# Patient Record
Sex: FEMALE | Race: WHITE
[De-identification: names, ages, dates, MRNs, and addresses within clinical notes are randomized per-mention and may not be internally consistent; named-entity substitution may affect disease eponyms.]

---

## 2019-06-21 ENCOUNTER — HOSPITAL ENCOUNTER (EMERGENCY)
Dept: HOSPITAL 47 - EC | Age: 69
Discharge: HOME | End: 2019-06-21
Payer: MEDICARE

## 2019-06-21 VITALS
DIASTOLIC BLOOD PRESSURE: 92 MMHG | HEART RATE: 72 BPM | RESPIRATION RATE: 19 BRPM | TEMPERATURE: 98.6 F | SYSTOLIC BLOOD PRESSURE: 125 MMHG

## 2019-06-21 DIAGNOSIS — I10: ICD-10-CM

## 2019-06-21 DIAGNOSIS — Z79.899: ICD-10-CM

## 2019-06-21 DIAGNOSIS — J32.9: ICD-10-CM

## 2019-06-21 DIAGNOSIS — Z88.5: ICD-10-CM

## 2019-06-21 DIAGNOSIS — T78.40XA: Primary | ICD-10-CM

## 2019-06-21 DIAGNOSIS — J20.9: ICD-10-CM

## 2019-06-21 DIAGNOSIS — K21.9: ICD-10-CM

## 2019-06-21 DIAGNOSIS — N39.0: ICD-10-CM

## 2019-06-21 DIAGNOSIS — Z91.013: ICD-10-CM

## 2019-06-21 DIAGNOSIS — Z88.8: ICD-10-CM

## 2019-06-21 LAB
HYALINE CASTS UR QL AUTO: 78 /LPF (ref 0–2)
PH UR: 5.5 [PH] (ref 5–8)
RBC UR QL: 36 /HPF (ref 0–5)
SP GR UR: 1.01 (ref 1–1.03)
SQUAMOUS UR QL AUTO: 3 /HPF (ref 0–4)
UROBILINOGEN UR QL STRIP: <2 MG/DL (ref ?–2)
WBC #/AREA URNS HPF: 76 /HPF (ref 0–5)

## 2019-06-21 PROCEDURE — 71046 X-RAY EXAM CHEST 2 VIEWS: CPT

## 2019-06-21 PROCEDURE — 87086 URINE CULTURE/COLONY COUNT: CPT

## 2019-06-21 PROCEDURE — 99283 EMERGENCY DEPT VISIT LOW MDM: CPT

## 2019-06-21 PROCEDURE — 81001 URINALYSIS AUTO W/SCOPE: CPT

## 2019-06-21 NOTE — XR
EXAMINATION TYPE: XR chest 2V

 

DATE OF EXAM: 6/21/2019

 

COMPARISON: 2/10/2013

 

HISTORY: Chest pain

 

TECHNIQUE:  Frontal and lateral views of the chest are obtained.

 

FINDINGS:  Heart and mediastinum are normal. Lungs are clear. Diaphragm is normal. Bony thorax appear
s normal.

 

IMPRESSION:  Normal chest. No change.

## 2019-06-21 NOTE — ED
General Adult HPI





- General


Chief complaint: Nausea/Vomiting/Diarrhea


Stated complaint: ENT


Time Seen by Provider: 06/21/19 18:02


Source: patient


Mode of arrival: ambulatory


Limitations: no limitations





- History of Present Illness


Initial comments: 


69-year-old female patient presents to the emergency department today for 

evaluation after having what she believes to be an ALLERGIC reaction from 

antibiotics she started yesterday.  Patient states that she was seen and 

evaluated at urgent care yesterday for a 3 week history of nasal congestion, 

facial pressure, and cough.  Patient states that she sought care because her 

cough is causing her to have urinary incontinence.  States that she was 

evaluated and diagnosed with a sinus infection, right otitis media, and urinary 

tract infection.  States that she took 1 dose of the antibiotic yesterday and 

then one dose today.  States that she did have swelling to her lips and to her 

hands.  States that she called urgent care was instructed to take Benadryl 

proceed to the nearest emergency Department.  Patient states the swelling has 

improved at this time.  She denies any tongue swelling or throat swelling with 

this.  Denies any shortness of breath.  Patient states that she feels generally 

unwell and has continued to have urinary incontinence.  States that he did not 

perform urinalysis yesterday were treating based on symptoms.  Patient denies 

any recent rash, fever, chills, chest pain, nausea, vomiting, diarrhea, 

constipation, back pain, numbness, tingling, dizziness, weakness, headache, 

visual changes, or any other complaints.








- Related Data


                                Home Medications











 Medication  Instructions  Recorded  Confirmed


 


Cefdinir 300 mg PO Q12HR 06/21/19 06/21/19


 


Estradiol [Estrace] 0.5 mg PO BID 06/21/19 06/21/19


 


Losartan/Hydrochlorothiazide 1 tab PO DAILY 06/21/19 06/21/19





[Hyzaar 100-25 Tablet]   


 


Metoprolol Tartrate [Lopressor] 50 mg PO BID 06/21/19 06/21/19


 


Omeprazole [PriLOSEC] 20 mg PO DAILY 06/21/19 06/21/19


 


cloNIDine HCL [Catapres] 0.2 mg PO BID 06/21/19 06/21/19








                                  Previous Rx's











 Medication  Instructions  Recorded


 


Famotidine [Pepcid] 20 mg PO HS #30 tablet 06/21/19


 


Phenazopyridine HCl [Pyridium] 100 mg PO TID #9 tab 06/21/19


 


Sulfamethoxazole/Trimethoprim 1 each PO BID #20 tablet 06/21/19





[Bactrim -160 mg]  


 


predniSONE 50 mg PO DAILY #5 tablet 06/21/19











                                    Allergies











Allergy/AdvReac Type Severity Reaction Status Date / Time


 


codeine Allergy  Anaphylaxis Verified 06/21/19 18:00


 


Iodine and Iodide Containing Allergy  Anaphylaxis Verified 06/21/19 18:00





Produc     


 


shellfish derived [Shellfish] Allergy  Anaphylaxis Verified 06/21/19 18:43


 


shrimp Allergy  Anaphylaxis Verified 06/21/19 18:43














Review of Systems


ROS Statement: 


Those systems with pertinent positive or pertinent negative responses have been 

documented in the HPI.





ROS Other: All systems not noted in ROS Statement are negative.





Past Medical History


Past Medical History: GERD/Reflux, Hypertension


History of Any Multi-Drug Resistant Organisms: None Reported


Past Surgical History: Hysterectomy


Past Psychological History: No Psychological Hx Reported


Smoking Status: Never smoker


Past Alcohol Use History: None Reported


Past Drug Use History: None Reported





General Exam


Limitations: no limitations


General appearance: alert, in no apparent distress, other (Physical well-dev

eloped, well-nourished adult female patient in no acute distress.  Vital signs 

upon presentation are temperature 98.1F, pulse 102, respirations 22, blood 

pressure 168/80, pulse ox 100% on room air.)


Eye exam: Present: normal appearance, PERRL, EOMI.  Absent: scleral icterus, 

conjunctival injection, periorbital swelling


ENT exam: Present: normal exam, normal oropharynx, mucous membranes moist, TM's 

normal bilaterally


Respiratory exam: Present: normal lung sounds bilaterally.  Absent: respiratory 

distress, wheezes, rales, rhonchi, stridor


Cardiovascular Exam: Present: regular rate, normal rhythm, normal heart sounds. 

 Absent: systolic murmur, diastolic murmur, rubs, gallop, clicks


GI/Abdominal exam: Present: soft, normal bowel sounds.  Absent: distended, 

tenderness, guarding, rebound, rigid


Neurological exam: Present: alert, oriented X3, CN II-XII intact


Psychiatric exam: Present: normal affect, normal mood


Skin exam: Present: warm, dry, intact, normal color.  Absent: rash





Course


                                   Vital Signs











  06/21/19 06/21/19





  17:56 19:28


 


Temperature 98.1 F 


 


Pulse Rate 102 H 88


 


Respiratory 22 17





Rate  


 


Blood Pressure 168/80 141/92


 


O2 Sat by Pulse 100 100





Oximetry  














Medical Decision Making





- Medical Decision Making


69-year-old female patient presented to the emergency department today for 

evaluation of possible ALLERGIC reaction to Cefdinir.  Patient states that her 

second dose this morning and afterwards had lip and hand swelling.  Patient 

presented to urgent care yesterday for symptoms of urinary incontinence, cough, 

nasal congestion 3 weeks.  Physical examination did reveal soft nontender 

abdomen.  Chest x-ray showed no acute cardio pulmonary process.  Urinalysis was 

obtained and showed a cloudy appearance with trace protein, moderate blood, 

large leukocyte esterase, 36 red blood cells, 76 white blood cells, rare 

amorphous sediment, few urine bacteria, 70 hyaline casts, and rare urine mucus. 

 Patient symptoms are consistent with acute bronchitis, she also has urinary 

tract infection.  We'll treat with prednisone for 5 days.  She'll be given 

Pepcid to prevent ALLERGIC symptoms returning.  She was started on Bactrim for 

UTI.  She is instructed to follow-up with her primary care physician for recheck

 in 1-2 days.  Return parameters were discussed in detail.  She verbalizes 

understanding and agrees with this plan.








- Lab Data


                                   Lab Results











  06/21/19 Range/Units





  18:40 


 


Urine Color  Yellow  


 


Urine Appearance  Cloudy H  (Clear)  


 


Urine pH  5.5  (5.0-8.0)  


 


Ur Specific Gravity  1.008  (1.001-1.035)  


 


Urine Protein  Trace H  (Negative)  


 


Urine Glucose (UA)  Negative  (Negative)  


 


Urine Ketones  Negative  (Negative)  


 


Urine Blood  Moderate H  (Negative)  


 


Urine Nitrite  Negative  (Negative)  


 


Urine Bilirubin  Negative  (Negative)  


 


Urine Urobilinogen  <2.0  (<2.0)  mg/dL


 


Ur Leukocyte Esterase  Large H  (Negative)  


 


Urine RBC  36 H  (0-5)  /hpf


 


Urine WBC  76 H  (0-5)  /hpf


 


Ur Squamous Epith Cells  3  (0-4)  /hpf


 


Amorphous Sediment  Rare H  (None)  /hpf


 


Urine Bacteria  Few H  (None)  /hpf


 


Hyaline Casts  78 H  (0-2)  /lpf


 


Urine Mucus  Rare H  (None)  /hpf














- Radiology Data


Radiology results: report reviewed, image reviewed


Two-view x-ray of the chest is obtained.  Report was reviewed in its entirety.  

Impression by Dr. Naranjo shows normal chest with no change.





Disposition


Clinical Impression: 


 Urinary tract infection, Allergic reaction, Acute bronchitis, Sinusitis





Disposition: HOME SELF-CARE


Condition: Good


Instructions (If sedation given, give patient instructions):  Urinary Tract 

Infection in Women (ED), Sinusitis (ED), Acute Bronchitis (ED), Antibiotic 

Medication Allergy (ED)


Additional Instructions: 


Increase fluids.  Take medications as directed.  Follow-up with your primary 

care physician for recheck in 1-2 days.  Return to the emergency department 

immediately for any new, worsening, or concerning symptoms.


Prescriptions: 


Sulfamethoxazole/Trimethoprim [Bactrim -160 mg] 1 each PO BID #20 tablet


Famotidine [Pepcid] 20 mg PO HS #30 tablet


predniSONE 50 mg PO DAILY #5 tablet


Phenazopyridine HCl [Pyridium] 100 mg PO TID #9 tab


Is patient prescribed a controlled substance at d/c from ED?: No


Referrals: 


Donnell Boykin DO [Primary Care Provider] - 1-2 days


Time of Disposition: 19:57

## 2021-05-26 ENCOUNTER — HOSPITAL ENCOUNTER (OUTPATIENT)
Dept: HOSPITAL 47 - RADNMMAIN | Age: 71
Discharge: HOME | End: 2021-05-26
Attending: FAMILY MEDICINE
Payer: MEDICARE

## 2021-05-26 DIAGNOSIS — K81.0: Primary | ICD-10-CM

## 2021-05-26 PROCEDURE — 78226 HEPATOBILIARY SYSTEM IMAGING: CPT

## 2021-05-26 NOTE — NM
EXAMINATION TYPE: NM hepatobiliary wo EF

 

DATE OF EXAM: 5/26/2021

 

COMPARISON: NONE

 

HISTORY: Acute cholecystitis border. Epigastric pain with reflux-like symptoms.

 

TECHNIQUE: After the intravenous administration of 4.6 mCi Tc 99m Mebrofenin hepatobiliary scintigrap
hy is performed.  Immediate images post injection.

 

FINDINGS: 

There is satisfactory initial accumulation of tracer by the liver.  The gallbladder is visualized wit
hin 30 minutes slightly more centrally in position overlying the common bile duct near the lanre hepa
tis.  Exam was performed up to 4 hours to show no visualization of other rounded structure. There is 
complete radiotracer clearance from the liver. The small bowel activity is noted within 20 minutes.  
Therefore there is no scintigraphic evidence of cystic or common bile duct obstruction to suggest acu
te cholecystitis. 

 

IMPRESSION: Exam is felt within normal limits.

## 2022-11-09 ENCOUNTER — HOSPITAL ENCOUNTER (INPATIENT)
Dept: HOSPITAL 47 - EC | Age: 72
LOS: 6 days | Discharge: HOME | DRG: 813 | End: 2022-11-15
Attending: FAMILY MEDICINE | Admitting: FAMILY MEDICINE
Payer: MEDICARE

## 2022-11-09 DIAGNOSIS — I10: ICD-10-CM

## 2022-11-09 DIAGNOSIS — E87.6: ICD-10-CM

## 2022-11-09 DIAGNOSIS — Z91.013: ICD-10-CM

## 2022-11-09 DIAGNOSIS — I08.1: ICD-10-CM

## 2022-11-09 DIAGNOSIS — Z90.710: ICD-10-CM

## 2022-11-09 DIAGNOSIS — E83.42: ICD-10-CM

## 2022-11-09 DIAGNOSIS — I48.20: ICD-10-CM

## 2022-11-09 DIAGNOSIS — Z88.5: ICD-10-CM

## 2022-11-09 DIAGNOSIS — E66.01: ICD-10-CM

## 2022-11-09 DIAGNOSIS — Z79.01: ICD-10-CM

## 2022-11-09 DIAGNOSIS — K31.7: ICD-10-CM

## 2022-11-09 DIAGNOSIS — D62: ICD-10-CM

## 2022-11-09 DIAGNOSIS — J33.8: ICD-10-CM

## 2022-11-09 DIAGNOSIS — Z79.899: ICD-10-CM

## 2022-11-09 DIAGNOSIS — K21.9: ICD-10-CM

## 2022-11-09 DIAGNOSIS — K57.31: ICD-10-CM

## 2022-11-09 DIAGNOSIS — Z91.041: ICD-10-CM

## 2022-11-09 DIAGNOSIS — D68.32: Primary | ICD-10-CM

## 2022-11-09 DIAGNOSIS — K64.4: ICD-10-CM

## 2022-11-09 DIAGNOSIS — Z79.82: ICD-10-CM

## 2022-11-09 DIAGNOSIS — K29.50: ICD-10-CM

## 2022-11-09 LAB
ALBUMIN SERPL-MCNC: 3.9 G/DL (ref 3.5–5)
ALP SERPL-CCNC: 83 U/L (ref 38–126)
ALT SERPL-CCNC: 14 U/L (ref 4–34)
ANION GAP SERPL CALC-SCNC: 9 MMOL/L
APTT BLD: 21.2 SEC (ref 22–30)
AST SERPL-CCNC: 28 U/L (ref 14–36)
BASOPHILS # BLD AUTO: 0 K/UL (ref 0–0.2)
BASOPHILS NFR BLD AUTO: 1 %
BUN SERPL-SCNC: 23 MG/DL (ref 7–17)
CALCIUM SPEC-MCNC: 9.9 MG/DL (ref 8.4–10.2)
CHLORIDE SERPL-SCNC: 105 MMOL/L (ref 98–107)
CO2 SERPL-SCNC: 23 MMOL/L (ref 22–30)
EOSINOPHIL # BLD AUTO: 0.2 K/UL (ref 0–0.7)
EOSINOPHIL NFR BLD AUTO: 2 %
ERYTHROCYTE [DISTWIDTH] IN BLOOD BY AUTOMATED COUNT: 2.06 M/UL (ref 3.8–5.4)
ERYTHROCYTE [DISTWIDTH] IN BLOOD: 17.2 % (ref 11.5–15.5)
GLUCOSE BLD-MCNC: 168 MG/DL (ref 70–110)
GLUCOSE SERPL-MCNC: 131 MG/DL (ref 74–99)
HCT VFR BLD AUTO: 16.5 % (ref 34–46)
HGB BLD-MCNC: 4.6 GM/DL (ref 11.4–16)
INR PPP: 1.2 (ref ?–1.2)
LYMPHOCYTES # SPEC AUTO: 1.7 K/UL (ref 1–4.8)
LYMPHOCYTES NFR SPEC AUTO: 24 %
MAGNESIUM SPEC-SCNC: 1.4 MG/DL (ref 1.6–2.3)
MCH RBC QN AUTO: 22.4 PG (ref 25–35)
MCHC RBC AUTO-ENTMCNC: 28 G/DL (ref 31–37)
MCV RBC AUTO: 80.1 FL (ref 80–100)
MONOCYTES # BLD AUTO: 0.5 K/UL (ref 0–1)
MONOCYTES NFR BLD AUTO: 7 %
NEUTROPHILS # BLD AUTO: 4.4 K/UL (ref 1.3–7.7)
NEUTROPHILS NFR BLD AUTO: 63 %
PLATELET # BLD AUTO: 196 K/UL (ref 150–450)
POTASSIUM SERPL-SCNC: 3.6 MMOL/L (ref 3.5–5.1)
PROT SERPL-MCNC: 6.9 G/DL (ref 6.3–8.2)
PT BLD: 12.9 SEC (ref 9–12)
SODIUM SERPL-SCNC: 137 MMOL/L (ref 137–145)
WBC # BLD AUTO: 7 K/UL (ref 3.8–10.6)

## 2022-11-09 PROCEDURE — 36430 TRANSFUSION BLD/BLD COMPNT: CPT

## 2022-11-09 PROCEDURE — 86850 RBC ANTIBODY SCREEN: CPT

## 2022-11-09 PROCEDURE — 85730 THROMBOPLASTIN TIME PARTIAL: CPT

## 2022-11-09 PROCEDURE — 85610 PROTHROMBIN TIME: CPT

## 2022-11-09 PROCEDURE — 86900 BLOOD TYPING SEROLOGIC ABO: CPT

## 2022-11-09 PROCEDURE — 80048 BASIC METABOLIC PNL TOTAL CA: CPT

## 2022-11-09 PROCEDURE — 82728 ASSAY OF FERRITIN: CPT

## 2022-11-09 PROCEDURE — 82272 OCCULT BLD FECES 1-3 TESTS: CPT

## 2022-11-09 PROCEDURE — 82607 VITAMIN B-12: CPT

## 2022-11-09 PROCEDURE — 43239 EGD BIOPSY SINGLE/MULTIPLE: CPT

## 2022-11-09 PROCEDURE — 84484 ASSAY OF TROPONIN QUANT: CPT

## 2022-11-09 PROCEDURE — 80053 COMPREHEN METABOLIC PANEL: CPT

## 2022-11-09 PROCEDURE — 83550 IRON BINDING TEST: CPT

## 2022-11-09 PROCEDURE — 85027 COMPLETE CBC AUTOMATED: CPT

## 2022-11-09 PROCEDURE — 88342 IMHCHEM/IMCYTCHM 1ST ANTB: CPT

## 2022-11-09 PROCEDURE — 30233N1 TRANSFUSION OF NONAUTOLOGOUS RED BLOOD CELLS INTO PERIPHERAL VEIN, PERCUTANEOUS APPROACH: ICD-10-PCS

## 2022-11-09 PROCEDURE — 0DJD8ZZ INSPECTION OF LOWER INTESTINAL TRACT, VIA NATURAL OR ARTIFICIAL OPENING ENDOSCOPIC: ICD-10-PCS

## 2022-11-09 PROCEDURE — 82746 ASSAY OF FOLIC ACID SERUM: CPT

## 2022-11-09 PROCEDURE — 85025 COMPLETE CBC W/AUTO DIFF WBC: CPT

## 2022-11-09 PROCEDURE — 86920 COMPATIBILITY TEST SPIN: CPT

## 2022-11-09 PROCEDURE — 71046 X-RAY EXAM CHEST 2 VIEWS: CPT

## 2022-11-09 PROCEDURE — 36415 COLL VENOUS BLD VENIPUNCTURE: CPT

## 2022-11-09 PROCEDURE — 99291 CRITICAL CARE FIRST HOUR: CPT

## 2022-11-09 PROCEDURE — 88305 TISSUE EXAM BY PATHOLOGIST: CPT

## 2022-11-09 PROCEDURE — 45378 DIAGNOSTIC COLONOSCOPY: CPT

## 2022-11-09 PROCEDURE — 93005 ELECTROCARDIOGRAM TRACING: CPT

## 2022-11-09 PROCEDURE — 86901 BLOOD TYPING SEROLOGIC RH(D): CPT

## 2022-11-09 PROCEDURE — 83735 ASSAY OF MAGNESIUM: CPT

## 2022-11-09 PROCEDURE — 83540 ASSAY OF IRON: CPT

## 2022-11-09 PROCEDURE — 84132 ASSAY OF SERUM POTASSIUM: CPT

## 2022-11-09 NOTE — XR
EXAMINATION TYPE: XR chest 2V

 

DATE OF EXAM: 11/9/2022

 

COMPARISON: NONE

 

HISTORY: Short of breath

 

TECHNIQUE: 2 views

 

FINDINGS: Heart and mediastinum are normal. Lungs are clear. Diaphragm is normal. Bony thorax is inta
ct.

 

IMPRESSION: Normal chest. No change.

## 2022-11-09 NOTE — ED
General Adult HPI





- General


Chief complaint: Recheck/Abnormal Lab/Rx


Stated complaint: lab recheck


Time Seen by Provider: 11/09/22 19:00


Source: patient, RN notes reviewed, old records reviewed


Mode of arrival: ambulatory


Limitations: no limitations





- History of Present Illness


Initial comments: 





This is a 72-year-old female who presents emergency department stating that 

she's been tired lately and very short of breath.  Patient states blood work was

drawn today and she was told to come to the emergency department because of 

blood work showed a very low hemoglobin.  Patient states she's had a low 

hemoglobin in the past from a GI bleed.  Patient states she is on eliquis for 

her atrial fibrillation.  Patient also has noticed some increased swelling to 

her legs bilaterally.  Patient denies any recent fever chills or cough per 

patient denies any headache.  Patient states she is lightheaded when she exerts 

herself or stands up quickly.  Patient denies any abdominal pain patient denies 

any nausea vomiting or diarrhea.





- Related Data


                                Home Medications











 Medication  Instructions  Recorded  Confirmed


 


Omeprazole [PriLOSEC] 20 mg PO DAILY 06/21/19 11/09/22


 


cloNIDine HCL [Catapres] 0.2 mg PO BID 06/21/19 11/09/22


 


estradioL [Estrace] 0.5 mg PO BID 06/21/19 11/09/22


 


ALPRAZolam [Xanax] 0.25 mg PO DAILY PRN 11/09/22 11/09/22


 


Apixaban [Eliquis] 5 mg PO BID 11/09/22 11/09/22


 


Aspirin EC [Ecotrin Low Dose] 81 mg PO DAILY 11/09/22 11/09/22


 


Losartan/Hydrochlorothiazide 1 tab PO DAILY 11/09/22 11/09/22





[Losartan-Hctz 100-25 mg Tab]   


 


Metoprolol Tartrate [Lopressor] 50 mg PO TID 11/09/22 11/09/22











                                    Allergies











Allergy/AdvReac Type Severity Reaction Status Date / Time


 


codeine Allergy  Anaphylaxis Verified 11/09/22 19:53


 


Iodine and Iodide Containing Allergy  Anaphylaxis Verified 11/09/22 19:53





Produc     


 


shellfish derived [Shellfish] Allergy  Anaphylaxis Verified 11/09/22 19:53


 


shrimp Allergy  Anaphylaxis Verified 11/09/22 19:53














Review of Systems


ROS Statement: 


Those systems with pertinent positive or pertinent negative responses have been 

documented in the HPI.





ROS Other: All systems not noted in ROS Statement are negative.





Past Medical History


Past Medical History: GERD/Reflux, Hypertension


Additional Past Medical History / Comment(s): GI bleed.


History of Any Multi-Drug Resistant Organisms: None Reported


Past Surgical History: Hysterectomy


Past Psychological History: No Psychological Hx Reported


Smoking Status: Never smoker


Past Alcohol Use History: None Reported


Past Drug Use History: None Reported





General Exam





- General Exam Comments


Initial Comments: 





GENERAL:


Patient is well-developed and well-nourished.  Patient is nontoxic and well-

hydrated and is in no acute distress.





ENT:


Neck is soft and supple.  No significant lymphadenopathy is noted.  Oropharynx 

is clear.  Moist mucous membranes.  Neck has full range of motion without 

eliciting any pain.  





EYES:


The sclera were anicteric and conjunctiva pale.  Extraocular movements were 

intact and pupils were equal round and reactive to light.  Eyelids were 

unremarkable.





PULMONARY:


Unlabored respirations.  Good breath sounds bilaterally.  No audible rales 

rhonchi or wheezing was noted.





CARDIOVASCULAR:


There is a regular rate and rhythm without any murmurs gallops or rubs.  





ABDOMEN:


Soft and nontender with normal bowel sounds.  





SKIN:


Patient's skin is very pale.





NEUROLOGIC:


Patient is alert and oriented x3.  Cranial nerves II through XII are grossly 

intact.  Motor and sensory are also intact.  Normal speech, volume and content. 

 Symmetrical smile. 





MUSCULOSKELETAL:


Normal extremities with adequate strength and full range of motion.  1+ edema 

bilaterally





LYMPHATICS:


No significant lymphadenopathy is noted





PSYCHIATRIC:


Normal psychiatric evaluation. 


Limitations: no limitations





Course


                                   Vital Signs











  11/09/22 11/09/22





  18:42 19:58


 


Temperature 97.8 F 97.9 F


 


Pulse Rate 103 H 90


 


Respiratory 20 16





Rate  


 


Blood Pressure 119/60 134/81


 


O2 Sat by Pulse 100 100





Oximetry  














Medical Decision Making





- Medical Decision Making





Patient's hemoglobin came back 4.5.





I ordered 2 units packed red blood cell per the patient.





I spoke with Dr. Boykin she agreed to admit the patient admitted the patient 

wrote admitting orders





I interpreted EKG.  EKG shows atrial fibrillation at 92 bpm QRS is 99 QT 

interval is 364 QTC is 413.  Patient's EKG is of poor quality but no obvious ST 

segment elevations noted





- Lab Data


Result diagrams: 


                                 11/09/22 19:19





                                 11/09/22 19:19


                                   Lab Results











  11/09/22 11/09/22 11/09/22 Range/Units





  19:04 19:19 19:19 


 


WBC   7.0   (3.8-10.6)  k/uL


 


RBC   2.06 L   (3.80-5.40)  m/uL


 


Hgb   4.6 L*   (11.4-16.0)  gm/dL


 


Hct   16.5 L*   (34.0-46.0)  %


 


MCV   80.1   (80.0-100.0)  fL


 


MCH   22.4 L   (25.0-35.0)  pg


 


MCHC   28.0 L   (31.0-37.0)  g/dL


 


RDW   17.2 H   (11.5-15.5)  %


 


Plt Count   196   (150-450)  k/uL


 


MPV   10.2   


 


Hypochromasia   Marked   


 


Poikilocytosis   Slight   


 


Anisocytosis   Slight   


 


Microcytosis   Slight   


 


PT    12.9 H  (9.0-12.0)  sec


 


INR    1.2 H  (<1.2)  


 


APTT    21.2 L  (22.0-30.0)  sec


 


Sodium     (137-145)  mmol/L


 


Potassium     (3.5-5.1)  mmol/L


 


Chloride     ()  mmol/L


 


Carbon Dioxide     (22-30)  mmol/L


 


Anion Gap     mmol/L


 


BUN     (7-17)  mg/dL


 


Creatinine     (0.52-1.04)  mg/dL


 


Est GFR (CKD-EPI)AfAm     (>60 ml/min/1.73 sqM)  


 


Est GFR (CKD-EPI)NonAf     (>60 ml/min/1.73 sqM)  


 


Glucose     (74-99)  mg/dL


 


Calcium     (8.4-10.2)  mg/dL


 


Magnesium     (1.6-2.3)  mg/dL


 


Total Bilirubin     (0.2-1.3)  mg/dL


 


AST     (14-36)  U/L


 


ALT     (4-34)  U/L


 


Alkaline Phosphatase     ()  U/L


 


Troponin I     (0.000-0.034)  ng/mL


 


Total Protein     (6.3-8.2)  g/dL


 


Albumin     (3.5-5.0)  g/dL


 


Blood Type  B Negative    


 


Blood Type Confirm     


 


Blood Type Recheck  No Previous Record    


 


Bld Type Recheck Status  CABO Indicated    


 


Antibody Screen  NEGATIVE    


 


Crossmatch  See Detail    


 


Spec Expiration Date  11/12/2022/12/2022 - 2304 11/09/22 11/09/22 11/09/22 Range/Units





  19:19 19:19 19:19 


 


WBC     (3.8-10.6)  k/uL


 


RBC     (3.80-5.40)  m/uL


 


Hgb     (11.4-16.0)  gm/dL


 


Hct     (34.0-46.0)  %


 


MCV     (80.0-100.0)  fL


 


MCH     (25.0-35.0)  pg


 


MCHC     (31.0-37.0)  g/dL


 


RDW     (11.5-15.5)  %


 


Plt Count     (150-450)  k/uL


 


MPV     


 


Hypochromasia     


 


Poikilocytosis     


 


Anisocytosis     


 


Microcytosis     


 


PT     (9.0-12.0)  sec


 


INR     (<1.2)  


 


APTT     (22.0-30.0)  sec


 


Sodium  137    (137-145)  mmol/L


 


Potassium  3.6    (3.5-5.1)  mmol/L


 


Chloride  105    ()  mmol/L


 


Carbon Dioxide  23    (22-30)  mmol/L


 


Anion Gap  9    mmol/L


 


BUN  23 H    (7-17)  mg/dL


 


Creatinine  1.10 H    (0.52-1.04)  mg/dL


 


Est GFR (CKD-EPI)AfAm  58    (>60 ml/min/1.73 sqM)  


 


Est GFR (CKD-EPI)NonAf  51    (>60 ml/min/1.73 sqM)  


 


Glucose  131 H    (74-99)  mg/dL


 


Calcium  9.9    (8.4-10.2)  mg/dL


 


Magnesium  1.4 L    (1.6-2.3)  mg/dL


 


Total Bilirubin  0.9    (0.2-1.3)  mg/dL


 


AST  28    (14-36)  U/L


 


ALT  14    (4-34)  U/L


 


Alkaline Phosphatase  83    ()  U/L


 


Troponin I   <0.012   (0.000-0.034)  ng/mL


 


Total Protein  6.9    (6.3-8.2)  g/dL


 


Albumin  3.9    (3.5-5.0)  g/dL


 


Blood Type     


 


Blood Type Confirm    B Negative  


 


Blood Type Recheck     


 


Bld Type Recheck Status     


 


Antibody Screen     


 


Crossmatch     


 


Spec Expiration Date     














Critical Care Time


Critical Care Time: Yes


Total Critical Care Time: 35





Disposition


Clinical Impression: 


 GI bleed, Anemia





Disposition: ADMITTED AS IP TO THIS HOSP


Time of Disposition: 19:37

## 2022-11-10 LAB
BASOPHILS # BLD AUTO: 0.1 K/UL (ref 0–0.2)
BASOPHILS NFR BLD AUTO: 1 %
EOSINOPHIL # BLD AUTO: 0.2 K/UL (ref 0–0.7)
EOSINOPHIL NFR BLD AUTO: 2 %
ERYTHROCYTE [DISTWIDTH] IN BLOOD BY AUTOMATED COUNT: 2.61 M/UL (ref 3.8–5.4)
ERYTHROCYTE [DISTWIDTH] IN BLOOD BY AUTOMATED COUNT: 2.96 M/UL (ref 3.8–5.4)
ERYTHROCYTE [DISTWIDTH] IN BLOOD BY AUTOMATED COUNT: 3.02 M/UL (ref 3.8–5.4)
ERYTHROCYTE [DISTWIDTH] IN BLOOD: 16.5 % (ref 11.5–15.5)
ERYTHROCYTE [DISTWIDTH] IN BLOOD: 17.1 % (ref 11.5–15.5)
ERYTHROCYTE [DISTWIDTH] IN BLOOD: 17.2 % (ref 11.5–15.5)
HCT VFR BLD AUTO: 21.9 % (ref 34–46)
HCT VFR BLD AUTO: 25 % (ref 34–46)
HCT VFR BLD AUTO: 25.3 % (ref 34–46)
HGB BLD-MCNC: 6.7 GM/DL (ref 11.4–16)
HGB BLD-MCNC: 7.9 GM/DL (ref 11.4–16)
HGB BLD-MCNC: 8 GM/DL (ref 11.4–16)
LYMPHOCYTES # SPEC AUTO: 1.7 K/UL (ref 1–4.8)
LYMPHOCYTES NFR SPEC AUTO: 24 %
MCH RBC QN AUTO: 25.7 PG (ref 25–35)
MCH RBC QN AUTO: 26.5 PG (ref 25–35)
MCH RBC QN AUTO: 26.7 PG (ref 25–35)
MCHC RBC AUTO-ENTMCNC: 30.7 G/DL (ref 31–37)
MCHC RBC AUTO-ENTMCNC: 31.4 G/DL (ref 31–37)
MCHC RBC AUTO-ENTMCNC: 31.8 G/DL (ref 31–37)
MCV RBC AUTO: 83.6 FL (ref 80–100)
MCV RBC AUTO: 83.7 FL (ref 80–100)
MCV RBC AUTO: 84.4 FL (ref 80–100)
MONOCYTES # BLD AUTO: 0.5 K/UL (ref 0–1)
MONOCYTES NFR BLD AUTO: 7 %
NEUTROPHILS # BLD AUTO: 4.4 K/UL (ref 1.3–7.7)
NEUTROPHILS NFR BLD AUTO: 63 %
PLATELET # BLD AUTO: 182 K/UL (ref 150–450)
PLATELET # BLD AUTO: 191 K/UL (ref 150–450)
PLATELET # BLD AUTO: 215 K/UL (ref 150–450)
WBC # BLD AUTO: 6.3 K/UL (ref 3.8–10.6)
WBC # BLD AUTO: 7 K/UL (ref 3.8–10.6)
WBC # BLD AUTO: 7.5 K/UL (ref 3.8–10.6)

## 2022-11-10 PROCEDURE — 0DB78ZX EXCISION OF STOMACH, PYLORUS, VIA NATURAL OR ARTIFICIAL OPENING ENDOSCOPIC, DIAGNOSTIC: ICD-10-PCS

## 2022-11-10 RX ADMIN — METOPROLOL TARTRATE SCH MG: 50 TABLET, FILM COATED ORAL at 18:14

## 2022-11-10 RX ADMIN — MAGNESIUM SULFATE IN DEXTROSE SCH MLS/HR: 10 INJECTION, SOLUTION INTRAVENOUS at 15:36

## 2022-11-10 RX ADMIN — METOPROLOL TARTRATE SCH MG: 50 TABLET, FILM COATED ORAL at 07:15

## 2022-11-10 RX ADMIN — LOSARTAN POTASSIUM AND HYDROCHLOROTHIAZIDE SCH EACH: 12.5; 5 TABLET ORAL at 07:16

## 2022-11-10 RX ADMIN — METOPROLOL TARTRATE SCH MG: 50 TABLET, FILM COATED ORAL at 21:10

## 2022-11-10 RX ADMIN — MAGNESIUM SULFATE IN DEXTROSE SCH MLS/HR: 10 INJECTION, SOLUTION INTRAVENOUS at 18:14

## 2022-11-10 NOTE — P.OP
Date of Procedure: 11/10/22


Preoperative Diagnosis: 


Anemia


Postoperative Diagnosis: 


Antral polyp





No evidence of upper GI bleed


Procedure(s) Performed: 


EGD


Anesthesia: MAC


Surgeon: Hany Flores


Pathology: other (Antral polyp)


Condition: stable


Disposition: PACU


Description of Procedure: 


The patient's placed on the endoscopy table in the lateral position she received

IV sedation.  The gastroscope placed oropharynx passed in the esophagus and 

stomach.  Scope was then placed through the pylorus.  The first and second 

portion of duodenum appeared normal.  Scope was then brought back the antrum 

this there was a polyp seen in the antrum this was mildly inflamed.  A biopsies 

performed.  The scope was then retroflexed and the remainder of the stomach 

appeared normal.  The GE junction was at 40 cm per the distal esophagus appeared

normal.  The proximal esophagus appeared normal.  The scope was withdrawn.





There was no evidence of any upper GI bleed.

## 2022-11-10 NOTE — P.HPIM
History of Present Illness


H&P Date: 11/10/22


Chief Complaint: Fatigued, shortness of breath


This is a pleasant 72-year-old female past medical history of GI bleed-last 

episode reported approximately one year ago, atrial fibrillation-on Eliquis and 

aspirin, gastroesophageal reflux disease, hypertension and multiple other 

medical issues presented to the ER with fatigue,shortness of breath and 

lightheadedness upon exertion.  He reports no rectal bleeding, no hemoptysis but

does report dark stools approximately one week ago- spontaneously subsided.  

Denies abdominal pain.  Denies nausea, vomiting or diarrhea.  Denies cough, 

congestion.  Denies chills or fevers.  Denies chest pain, palpitations.  

Troponin negative. Denies headaches Denies syncope.  Reports itchy extremities-

puritis. Patient had outpatient lab work reported low hemoglobin 4.5 and 

instructed to proceed to the ER. On admission, hemoglobin 4.6, platelets 196, 

INR 1.2.  Afebrile,1.4.  Blood pressure stable, heart rates mid 80s to 103, 

maintaining O2 sats of 100% on room air, respiratory rate 16-20.  Transfused 

with 2 units of packed RBCs in the ER with repeat hemoglobin 6.7.








Review of Systems


ROS Statement: 


Those systems with pertinent positive or pertinent negative responses have been 

documented in the HPI.





ROS Other: All systems not noted in ROS Statement are negative.











Past Medical History


Past Medical History: Atrial Fibrillation, GERD/Reflux, Hypertension


Additional Past Medical History / Comment(s): GI bleed.


History of Any Multi-Drug Resistant Organisms: None Reported


Past Surgical History: Hysterectomy, Tonsillectomy


Past Anesthesia/Blood Transfusion Reactions: No Reported Reaction


Past Psychological History: No Psychological Hx Reported


Smoking Status: Never smoker


Past Alcohol Use History: None Reported


Past Drug Use History: None Reported





Medications and Allergies


                                Home Medications











 Medication  Instructions  Recorded  Confirmed  Type


 


Omeprazole [PriLOSEC] 20 mg PO DAILY 06/21/19 11/09/22 History


 


cloNIDine HCL [Catapres] 0.2 mg PO BID 06/21/19 11/09/22 History


 


estradioL [Estrace] 0.5 mg PO BID 06/21/19 11/09/22 History


 


ALPRAZolam [Xanax] 0.25 mg PO DAILY PRN 11/09/22 11/09/22 History


 


Apixaban [Eliquis] 5 mg PO BID 11/09/22 11/09/22 History


 


Aspirin EC [Ecotrin Low Dose] 81 mg PO DAILY 11/09/22 11/09/22 History


 


Losartan/Hydrochlorothiazide 1 tab PO DAILY 11/09/22 11/09/22 History





[Losartan-Hctz 100-25 mg Tab]    


 


Metoprolol Tartrate [Lopressor] 50 mg PO TID 11/09/22 11/09/22 History








                                    Allergies











Allergy/AdvReac Type Severity Reaction Status Date / Time


 


codeine Allergy  Anaphylaxis Verified 11/09/22 19:53


 


Iodine and Iodide Containing Allergy  Anaphylaxis Verified 11/09/22 19:53





Produc     


 


shellfish derived [Shellfish] Allergy  Anaphylaxis Verified 11/09/22 19:53


 


shrimp Allergy  Anaphylaxis Verified 11/09/22 19:53














Physical Exam


Vitals: 


                                   Vital Signs











  Temp Pulse Resp BP Pulse Ox


 


 11/10/22 03:00  98.4 F  90  14  134/77  95


 


 11/10/22 02:00  98.4 F  95  16  134/77  97


 


 11/10/22 00:54  98.4 F  98  16  134/77  96


 


 11/10/22 00:34  98.6 F  96  18  134/67  98


 


 11/10/22 00:00   102 H  16  132/71  96


 


 11/09/22 23:50  98.5 F  94  16  139/64  98


 


 11/09/22 23:47  98.8 F  100  16  132/71  99


 


 11/09/22 23:00  98.4 F  96  11 L  124/78  100


 


 11/09/22 21:44  98.3 F  72  16  127/80  100


 


 11/09/22 20:48  98.3 F  95  16  137/72 


 


 11/09/22 20:28  98.1 F  96  18  138/100 


 


 11/09/22 20:27  98 F  92  16  138/99  100


 


 11/09/22 20:10  97.9 F  84  16  131/98 


 


 11/09/22 19:58  97.9 F  90  16  134/81  100


 


 11/09/22 18:42  97.8 F  103 H  20  119/60  100








                                Intake and Output











 11/09/22 11/10/22 11/10/22





 22:59 06:59 14:59


 


Intake Total 0 1040 


 


Balance 0 1040 


 


Intake:   


 


  Intake, IV Titration  450 





  Amount   


 


    Sodium Chloride 0.9% 1,  450 





    000 ml @ 75 mls/hr IV .   





    [id number] ONE Rx#:[id number]   


 


  Blood Product 0 590 


 


    Rc As-1  Unit 0 310 





    [id number]   


 


    Rc Pheresis As-3  Unit  280 





    [id number]   


 


Other:   


 


  Voiding Method  Bedpan 


 


  # Voids 1 1 


 


  Weight 108.862 kg 116.5 kg 











PHYSICAL EXAM:


VITAL SIGNS: [As above]


GENERAL: Sitting up in bed, no acute distress


HEENT:  Conjunctivae normal. eyes normal. 


NECK:  No JVD. No thyroid enlargement. No LNs


CARDIOVASCULAR:  S1, S2 regular.No murmur


RESPIRATION: Unlabored Breath sounds diminished in the bases. No rhonchi or 

crackles. No bronchial breathing.


ABDOMEN:  Soft,  nontender . No guarding. no masses palpable. No ascites, No 

hepatosplenomegaly.Bowel sounds heard.


LEGS: Mild edema of lower extremities, with pruritus


PSYCHIATRY: Alert and oriented X3, mood and affect normal.


NERVOUS SYSTEM:  Cranial N 2-12 grossly normal. No focal deficits.  Strength and

sensation grossly intact.


Skin: Warm and dry, pruritus /scabs  of lower extremities














Results


CBC & Chem 7: 


                                 11/10/22 05:35





                                 11/09/22 19:19


Labs: 


                  Abnormal Lab Results - Last 24 Hours (Table)











  11/09/22 11/09/22 11/09/22 Range/Units





  19:04 19:19 19:19 


 


RBC   2.06 L   (3.80-5.40)  m/uL


 


Hgb   4.6 L*   (11.4-16.0)  gm/dL


 


Hct   16.5 L*   (34.0-46.0)  %


 


MCH   22.4 L   (25.0-35.0)  pg


 


MCHC   28.0 L   (31.0-37.0)  g/dL


 


RDW   17.2 H   (11.5-15.5)  %


 


PT    12.9 H  (9.0-12.0)  sec


 


INR    1.2 H  (<1.2)  


 


APTT    21.2 L  (22.0-30.0)  sec


 


BUN     (7-17)  mg/dL


 


Creatinine     (0.52-1.04)  mg/dL


 


Glucose     (74-99)  mg/dL


 


POC Glucose (mg/dL)     ()  mg/dL


 


Magnesium     (1.6-2.3)  mg/dL


 


Crossmatch  See Detail    














  11/09/22 11/09/22 11/10/22 Range/Units





  19:19 22:18 05:35 


 


RBC    2.61 L  (3.80-5.40)  m/uL


 


Hgb    6.7 L* D  (11.4-16.0)  gm/dL


 


Hct    21.9 L  (34.0-46.0)  %


 


MCH     (25.0-35.0)  pg


 


MCHC    30.7 L  (31.0-37.0)  g/dL


 


RDW    16.5 H  (11.5-15.5)  %


 


PT     (9.0-12.0)  sec


 


INR     (<1.2)  


 


APTT     (22.0-30.0)  sec


 


BUN  23 H    (7-17)  mg/dL


 


Creatinine  1.10 H    (0.52-1.04)  mg/dL


 


Glucose  131 H    (74-99)  mg/dL


 


POC Glucose (mg/dL)   168 H   ()  mg/dL


 


Magnesium  1.4 L    (1.6-2.3)  mg/dL


 


Crossmatch     














Thrombosis Risk Factor Assmnt





- Choose All That Apply


Any of the Below Risk Factors Present?: Yes


Each Factor Represents 1 point: Obesity (BMI >25)


Other Risk Factors: Yes


Each Risk Factor Represents 2 Points: Age 61-74 years


Other congenital or acquired thrombophilia - If yes, enter type in comment: No


Thrombosis Risk Factor Assessment Total Risk Factor Score: 3


Thrombosis Risk Factor Assessment Level: Moderate Risk





Assessment and Plan


Assessment: 


Acute GI bleed with blood loss anemia, status post transfusion of packed RBCs


History of chronic A. fib on Eliquis and aspirin; both currently on hold.


Hypomagnesemia




















Plan: Continue on current medication regime ,monitoring and symptomatic treatme

nt.  Close monitoring of hemoglobin with Serial H&H's ordered.  Transfuse 

another unit of packed RBCs with Lasix 20 mg IV push times one to follow.  A 

year ago patient reports similar presentation, states she was taken to the OR 

but does not recall what procedure was completed at [facility] ; staff 

requested to obtain records.  Patient later developed maroon stools this morning

2, general surgery on consult with EGD scheduled.  Maintained on gentle IV 

fluids.  Our question aspirin remain on hold.  Prognosis guarded given multiple 

complex medical issues.  Repeat magnesium added onto the prior labs.  Magnesium 

replacement protocol ordered.








The impression and plan of care has been dictated as directed.





:


I performed a history and examination of this patient,  discussed the same with 

the dictator.  I agree with the dictator's note ,documented as a scribe.  Any 

additional findings or plans will be noted.

## 2022-11-10 NOTE — P.GSCN
History of Present Illness


Consult date: 11/10/22


History of present illness: 





CHIEF COMPLAINT: Anemia





HISTORY OF PRESENT ILLNESS: This is a 72-year-old female who presented to the 

emergency room due to a low hemoglobin and outpatient setting of 4.5.  Patient 

reports that she has been tired and very short of breath.  Patient denies any 

blood in her stools.  However since being admitted to the hospital she's had 2 

marooncolored stools.  She did receive 2 units of blood hemoglobin did go up 

from 4.6-6.7.  Patient also had anemia in June and April of this year.  She does

report having a GI bleed one year ago and she was at [facility].  She 

reports having a procedure done and she thinks she had surgery.  She is unsure 

of her diagnosis or why she was bleeding at that time.  Records from [facility] have been requested.  Patient is on Viky quest and aspirin she has a 

known history of Afib.  Patient denies any abdominal pain.  Denies any nausea or

vomiting.  Vitals are stable.





PAST MEDICAL HISTORY: 


GERD, hypertension, GI bleed





PAST SURGICAL HISTORY: 


Hysterectomy





MEDICATIONS: 


See below





ALLERGIES: 


See below





SOCIAL HISTORY: No illicit drug use.  





REVIEW OF SYSTEMS: 


CONSTITUTIONAL: Denies fever or chills.


HEENT: Denies blurred vision, vision changes, or eye pain. Denies hemoptysis 


CARDIOVASCULAR: Denies chest pain or pressure.


RESPIRATORY: No shortness of breath. 


GASTROINTESTINAL: See HPI for pertinent findings


HEMATOLOGIC: Denies bleeding disorders.


GENITOURINARY:  Denies any blood in urine or increased urinary frequency.  


SKIN: Denies pruitis. Denies rash.





PHYSICAL EXAM: 


VITAL SIGNS: Reviewed


GENERAL: Well-developed in no acute distress. 


HEENT:  No sclera icterus. Extraocular movements grossly intact.  Moist buccal 

mucosa. Head is atraumatic, normocephalic. No nasal drainage.


ABDOMEN:  Soft.  Obese.  Nondistended.  Nontender.


NEUROLOGIC:  Alert and oriented.  Cranial nerves II through XII grossly intact.





LABORATORY DATA:


WBC is 7 hemoglobin 4.5 up to 6.7 platelets 191


INR 1.2


Sodium is 137 potassium 3.6 creatinine 1.10


Magnesium 1.4


Troponin less than 0.012 


EKG atrial fibrillation





IMAGING:


Chest x-ray normal





ASSESSMENT: 


1.  Acute GI bleed with maroon stools 


2.  Acute blood loss anemia 


3.  History of A. fib on anticoagulation at home


4.  Hypomagnesemia 





PLAN: 


-Patient scheduled for EGD today with Dr. mendoza


-Keep patient nothing by mouth


-Transfuse patient with 1 unit of blood


-Continue IV fluids


-Continue PPI 


-Replace magnesium 


-Continue monitor for any signs or symptoms of bleeding 


-Continue to monitor hemoglobin 


-Hold Eliquis and aspirin 


-Obtain records from [facility] 





Physician Assistant note has been reviewed by physician. Signing provider agrees

with the documented findings, assessment, and plan of care. 





Past Medical History


Past Medical History: Atrial Fibrillation, GERD/Reflux, Hypertension


Additional Past Medical History / Comment(s): GI bleed.


History of Any Multi-Drug Resistant Organisms: None Reported


Past Surgical History: Hysterectomy, Tonsillectomy


Past Anesthesia/Blood Transfusion Reactions: No Reported Reaction


Past Psychological History: No Psychological Hx Reported


Smoking Status: Never smoker


Past Alcohol Use History: None Reported


Past Drug Use History: None Reported





Medications and Allergies


                                Home Medications











 Medication  Instructions  Recorded  Confirmed  Type


 


Omeprazole [PriLOSEC] 20 mg PO DAILY 06/21/19 11/09/22 History


 


cloNIDine HCL [Catapres] 0.2 mg PO BID 06/21/19 11/09/22 History


 


estradioL [Estrace] 0.5 mg PO BID 06/21/19 11/09/22 History


 


ALPRAZolam [Xanax] 0.25 mg PO DAILY PRN 11/09/22 11/09/22 History


 


Apixaban [Eliquis] 5 mg PO BID 11/09/22 11/09/22 History


 


Aspirin EC [Ecotrin Low Dose] 81 mg PO DAILY 11/09/22 11/09/22 History


 


Losartan/Hydrochlorothiazide 1 tab PO DAILY 11/09/22 11/09/22 History





[Losartan-Hctz 100-25 mg Tab]    


 


Metoprolol Tartrate [Lopressor] 50 mg PO TID 11/09/22 11/09/22 History








                                    Allergies











Allergy/AdvReac Type Severity Reaction Status Date / Time


 


codeine Allergy  Anaphylaxis Verified 11/09/22 19:53


 


Iodine and Iodide Containing Allergy  Anaphylaxis Verified 11/09/22 19:53





Produc     


 


shellfish derived [Shellfish] Allergy  Anaphylaxis Verified 11/09/22 19:53


 


shrimp Allergy  Anaphylaxis Verified 11/09/22 19:53














Surgical - Exam


                                   Vital Signs











Temp Pulse Resp BP Pulse Ox


 


 97.8 F   103 H  20   119/60   100 


 


 11/09/22 18:42  11/09/22 18:42  11/09/22 18:42  11/09/22 18:42  11/09/22 18:42














Results





- Labs





                                 11/10/22 05:35





                                 11/09/22 19:19


                  Abnormal Lab Results - Last 24 Hours (Table)











  11/09/22 11/09/22 11/09/22 Range/Units





  19:04 19:19 19:19 


 


RBC   2.06 L   (3.80-5.40)  m/uL


 


Hgb   4.6 L*   (11.4-16.0)  gm/dL


 


Hct   16.5 L*   (34.0-46.0)  %


 


MCH   22.4 L   (25.0-35.0)  pg


 


MCHC   28.0 L   (31.0-37.0)  g/dL


 


RDW   17.2 H   (11.5-15.5)  %


 


PT    12.9 H  (9.0-12.0)  sec


 


INR    1.2 H  (<1.2)  


 


APTT    21.2 L  (22.0-30.0)  sec


 


BUN     (7-17)  mg/dL


 


Creatinine     (0.52-1.04)  mg/dL


 


Glucose     (74-99)  mg/dL


 


POC Glucose (mg/dL)     ()  mg/dL


 


Magnesium     (1.6-2.3)  mg/dL


 


Crossmatch  See Detail    














  11/09/22 11/09/22 11/10/22 Range/Units





  19:19 22:18 05:35 


 


RBC    2.61 L  (3.80-5.40)  m/uL


 


Hgb    6.7 L* D  (11.4-16.0)  gm/dL


 


Hct    21.9 L  (34.0-46.0)  %


 


MCH     (25.0-35.0)  pg


 


MCHC    30.7 L  (31.0-37.0)  g/dL


 


RDW    16.5 H  (11.5-15.5)  %


 


PT     (9.0-12.0)  sec


 


INR     (<1.2)  


 


APTT     (22.0-30.0)  sec


 


BUN  23 H    (7-17)  mg/dL


 


Creatinine  1.10 H    (0.52-1.04)  mg/dL


 


Glucose  131 H    (74-99)  mg/dL


 


POC Glucose (mg/dL)   168 H   ()  mg/dL


 


Magnesium  1.4 L    (1.6-2.3)  mg/dL


 


Crossmatch     








                                 Diabetes panel











  11/09/22 Range/Units





  19:19 


 


Sodium  137  (137-145)  mmol/L


 


Potassium  3.6  (3.5-5.1)  mmol/L


 


Chloride  105  ()  mmol/L


 


Carbon Dioxide  23  (22-30)  mmol/L


 


BUN  23 H  (7-17)  mg/dL


 


Creatinine  1.10 H  (0.52-1.04)  mg/dL


 


Glucose  131 H  (74-99)  mg/dL


 


Calcium  9.9  (8.4-10.2)  mg/dL


 


AST  28  (14-36)  U/L


 


ALT  14  (4-34)  U/L


 


Alkaline Phosphatase  83  ()  U/L


 


Total Protein  6.9  (6.3-8.2)  g/dL


 


Albumin  3.9  (3.5-5.0)  g/dL








                                  Calcium panel











  11/09/22 Range/Units





  19:19 


 


Calcium  9.9  (8.4-10.2)  mg/dL


 


Albumin  3.9  (3.5-5.0)  g/dL








                                 Pituitary panel











  11/09/22 Range/Units





  19:19 


 


Sodium  137  (137-145)  mmol/L


 


Potassium  3.6  (3.5-5.1)  mmol/L


 


Chloride  105  ()  mmol/L


 


Carbon Dioxide  23  (22-30)  mmol/L


 


BUN  23 H  (7-17)  mg/dL


 


Creatinine  1.10 H  (0.52-1.04)  mg/dL


 


Glucose  131 H  (74-99)  mg/dL


 


Calcium  9.9  (8.4-10.2)  mg/dL








                                  Adrenal panel











  11/09/22 Range/Units





  19:19 


 


Sodium  137  (137-145)  mmol/L


 


Potassium  3.6  (3.5-5.1)  mmol/L


 


Chloride  105  ()  mmol/L


 


Carbon Dioxide  23  (22-30)  mmol/L


 


BUN  23 H  (7-17)  mg/dL


 


Creatinine  1.10 H  (0.52-1.04)  mg/dL


 


Glucose  131 H  (74-99)  mg/dL


 


Calcium  9.9  (8.4-10.2)  mg/dL


 


Total Bilirubin  0.9  (0.2-1.3)  mg/dL


 


AST  28  (14-36)  U/L


 


ALT  14  (4-34)  U/L


 


Alkaline Phosphatase  83  ()  U/L


 


Total Protein  6.9  (6.3-8.2)  g/dL


 


Albumin  3.9  (3.5-5.0)  g/dL

## 2022-11-11 LAB
ANION GAP SERPL CALC-SCNC: 7 MMOL/L
BUN SERPL-SCNC: 15 MG/DL (ref 7–17)
CALCIUM SPEC-MCNC: 10.2 MG/DL (ref 8.4–10.2)
CHLORIDE SERPL-SCNC: 104 MMOL/L (ref 98–107)
CO2 SERPL-SCNC: 26 MMOL/L (ref 22–30)
ERYTHROCYTE [DISTWIDTH] IN BLOOD BY AUTOMATED COUNT: 2.99 M/UL (ref 3.8–5.4)
ERYTHROCYTE [DISTWIDTH] IN BLOOD BY AUTOMATED COUNT: 3 M/UL (ref 3.8–5.4)
ERYTHROCYTE [DISTWIDTH] IN BLOOD: 17.3 % (ref 11.5–15.5)
ERYTHROCYTE [DISTWIDTH] IN BLOOD: 17.3 % (ref 11.5–15.5)
GLUCOSE SERPL-MCNC: 185 MG/DL (ref 74–99)
HCT VFR BLD AUTO: 24.9 % (ref 34–46)
HCT VFR BLD AUTO: 25.2 % (ref 34–46)
HGB BLD-MCNC: 7.9 GM/DL (ref 11.4–16)
HGB BLD-MCNC: 8 GM/DL (ref 11.4–16)
MAGNESIUM SPEC-SCNC: 1.7 MG/DL (ref 1.6–2.3)
MCH RBC QN AUTO: 26.5 PG (ref 25–35)
MCH RBC QN AUTO: 26.7 PG (ref 25–35)
MCHC RBC AUTO-ENTMCNC: 31.5 G/DL (ref 31–37)
MCHC RBC AUTO-ENTMCNC: 32 G/DL (ref 31–37)
MCV RBC AUTO: 83.3 FL (ref 80–100)
MCV RBC AUTO: 84 FL (ref 80–100)
PLATELET # BLD AUTO: 194 K/UL (ref 150–450)
PLATELET # BLD AUTO: 194 K/UL (ref 150–450)
POTASSIUM SERPL-SCNC: 3.5 MMOL/L (ref 3.5–5.1)
SODIUM SERPL-SCNC: 137 MMOL/L (ref 137–145)
WBC # BLD AUTO: 7.5 K/UL (ref 3.8–10.6)
WBC # BLD AUTO: 7.9 K/UL (ref 3.8–10.6)

## 2022-11-11 RX ADMIN — ACETAMINOPHEN PRN MG: 325 TABLET, FILM COATED ORAL at 10:14

## 2022-11-11 RX ADMIN — METOPROLOL TARTRATE SCH MG: 50 TABLET, FILM COATED ORAL at 16:29

## 2022-11-11 RX ADMIN — POTASSIUM CHLORIDE SCH MEQ: 20 TABLET, EXTENDED RELEASE ORAL at 09:24

## 2022-11-11 RX ADMIN — LOSARTAN POTASSIUM AND HYDROCHLOROTHIAZIDE SCH EACH: 12.5; 5 TABLET ORAL at 09:26

## 2022-11-11 RX ADMIN — METOPROLOL TARTRATE SCH MG: 50 TABLET, FILM COATED ORAL at 22:12

## 2022-11-11 RX ADMIN — MAGNESIUM SULFATE IN DEXTROSE SCH MLS/HR: 10 INJECTION, SOLUTION INTRAVENOUS at 09:24

## 2022-11-11 RX ADMIN — MAGNESIUM SULFATE IN DEXTROSE SCH MLS/HR: 10 INJECTION, SOLUTION INTRAVENOUS at 11:04

## 2022-11-11 RX ADMIN — POTASSIUM CHLORIDE SCH MEQ: 20 TABLET, EXTENDED RELEASE ORAL at 11:04

## 2022-11-11 RX ADMIN — METOPROLOL TARTRATE SCH MG: 50 TABLET, FILM COATED ORAL at 09:25

## 2022-11-11 RX ADMIN — PANTOPRAZOLE SODIUM SCH MG: 40 INJECTION, POWDER, FOR SOLUTION INTRAVENOUS at 09:25

## 2022-11-11 RX ADMIN — ACETAMINOPHEN PRN MG: 325 TABLET, FILM COATED ORAL at 22:41

## 2022-11-11 NOTE — P.CNPUL
History of Present Illness


Consult date: 11/11/22


Requesting physician: Donnell Boykin


Reason for consult: other (ICU management, acute GI bleeding)


Chief complaint: Fatigue and shortness of breath


History of present illness: 





This is a 72-year-old male with known history of atrial fibrillation, previous 

history of GI bleeding, and she was treated at [facility].  The patient 

herself had no clue as what was the source of her GI bleeding while she was at 

[facility].  Patient has been maintained on eliquis and on aspirin for her 

atrial fibrillation.  She presented to the ER on 11/9/22, mostly with symptoms 

of weakness fatigue and shortness of breath.  Outpatient Workup discovered that 

the patient had a hemoglobin of 4.5, and she was advised to go to ER.  In the ER

the patient received 2 units of packed RBCs and repeat hemoglobin was 6.7.  

Patient received a total of 3 units of packed RBCs since she was admitted and 

the patient has been as an overflow in the ICU.  Today I was asked to see her on

consultation.  He was already seen by general surgery on consultation, underwent

EGD yesterday, and the patient was found to have mostly antral polyp.  This was 

noted to be mildly inflamed, biopsies were performed, there was no evidence of 

active bleeding noted in the esophagus or stomach.  Now the patient is scheduled

for colonoscopy on Monday and this will be done by Dr. Flores.  In the 

meantime there is no evidence of active bleeding at this point, and her 

hemoglobin seems to be stable.  Labs today were noted to be relatively 

unremarkable and again her hemoglobin is 7.9.  Patient received a total of 3 

units of packed RBCs since admission and she underwent a nondiagnostic EGD





Review of Systems





Constitutional: Weakness and fatigue.


HEENT: Negative


Pulmonary: Shortness of breath on exertion


Cardiac: Chronic atrial fibrillation, no chest pain or orthopnea no PND


GI: As noted in HPI


Hematologic: History of previous GI bleeding patient is maintained on eliquis 

and aspirin


Neurologic: Negative


Psychiatric: Negative


Endocrine: Negative


Skin: Negative


Genitourinary:


Musculoskeletal:





Past Medical History


Past Medical History: Atrial Fibrillation, GERD/Reflux, Hypertension


Additional Past Medical History / Comment(s): GI bleed.


History of Any Multi-Drug Resistant Organisms: None Reported


Past Surgical History: Hysterectomy, Tonsillectomy


Past Anesthesia/Blood Transfusion Reactions: No Reported Reaction


Past Psychological History: No Psychological Hx Reported


Smoking Status: Never smoker


Past Alcohol Use History: None Reported


Past Drug Use History: None Reported





Medications and Allergies


                                Home Medications











 Medication  Instructions  Recorded  Confirmed  Type


 


Omeprazole [PriLOSEC] 20 mg PO DAILY 06/21/19 11/09/22 History


 


cloNIDine HCL [Catapres] 0.2 mg PO BID 06/21/19 11/09/22 History


 


estradioL [Estrace] 0.5 mg PO BID 06/21/19 11/09/22 History


 


ALPRAZolam [Xanax] 0.25 mg PO DAILY PRN 11/09/22 11/09/22 History


 


Apixaban [Eliquis] 5 mg PO BID 11/09/22 11/09/22 History


 


Aspirin EC [Ecotrin Low Dose] 81 mg PO DAILY 11/09/22 11/09/22 History


 


Losartan/Hydrochlorothiazide 1 tab PO DAILY 11/09/22 11/09/22 History





[Losartan-Hctz 100-25 mg Tab]    


 


Metoprolol Tartrate [Lopressor] 50 mg PO TID 11/09/22 11/09/22 History








                                    Allergies











Allergy/AdvReac Type Severity Reaction Status Date / Time


 


codeine Allergy  Anaphylaxis Verified 11/09/22 19:53


 


Iodine and Iodide Containing Allergy  Anaphylaxis Verified 11/09/22 19:53





Produc     


 


shellfish derived [Shellfish] Allergy  Anaphylaxis Verified 11/09/22 19:53


 


shrimp Allergy  Anaphylaxis Verified 11/09/22 19:53














Physical Exam


Vitals: 


                                   Vital Signs











  Temp Pulse Pulse Resp BP Pulse Ox


 


 11/11/22 12:32      139/70 


 


 11/11/22 12:00  98.3 F   82  17  160/74  98


 


 11/11/22 08:00    98  18  160/143 


 


 11/11/22 04:00  98.3 F   92  18  140/119  95


 


 11/11/22 00:00  98.6 F   104 H  20  140/73  94 L


 


 11/10/22 20:00  98.5 F   100  18  118/94  93 L


 


 11/10/22 16:00  98.5 F  96   16  119/81  97








                                Intake and Output











 11/10/22 11/11/22 11/11/22





 22:59 06:59 14:59


 


Intake Total 240  


 


Output Total 700 300 600


 


Balance -460 -300 -600


 


Intake:   


 


  Oral 240  


 


Output:   


 


  Urine 700 300 600


 


Other:   


 


  Voiding Method External Catheter External Catheter External Catheter


 


  Weight  116.2 kg 














Physical Exam


Gen.: Revealed a 72-year-old female in no distress.,  Patient is on room air O2 

sat is 96% to 98%


Head: Atraumatic, normocephalic.


HEENT:[Neck is supple.] [No neck masses.] [No thyromegaly.] [No JVD.]


Chest: [Clear throughout, no crackles, no rhonchi, no wheezes.]


Cardiac Exam: [Normal S1 and S2, no S3 gallop, no murmur.]


Abdomen: [Soft, nontender,  no megaly, no rebound, no guarding, normal bowel 

sounds.]


Extremities: [No clubbing, no edema, no cyanosis.]


Neurological Exam: [No focal neurologic deficit.]  Alert oriented 3.


Psychiatric: Normal mood affect and normal mental status examination.


Skin: No rashes.





Results





- Laboratory Findings


CBC and BMP: 


                                 11/11/22 06:44





                                 11/11/22 03:37


PT/INR, D-dimer











PT  12.9 sec (9.0-12.0)  H  11/09/22  19:19    


 


INR  1.2  (<1.2)  H  11/09/22  19:19    








Abnormal lab findings: 


                                  Abnormal Labs











  11/09/22 11/09/22 11/09/22





  19:04 19:19 19:19


 


RBC   2.06 L 


 


Hgb   4.6 L* 


 


Hct   16.5 L* 


 


MCH   22.4 L 


 


MCHC   28.0 L 


 


RDW   17.2 H 


 


PT    12.9 H


 


INR    1.2 H


 


APTT    21.2 L


 


BUN   


 


Creatinine   


 


Glucose   


 


POC Glucose (mg/dL)   


 


Magnesium   


 


Crossmatch  See Detail  














  11/09/22 11/09/22 11/10/22





  19:19 22:18 05:35


 


RBC    2.61 L


 


Hgb    6.7 L* D


 


Hct    21.9 L


 


MCH   


 


MCHC    30.7 L


 


RDW    16.5 H


 


PT   


 


INR   


 


APTT   


 


BUN  23 H  


 


Creatinine  1.10 H  


 


Glucose  131 H  


 


POC Glucose (mg/dL)   168 H 


 


Magnesium  1.4 L  


 


Crossmatch   














  11/10/22 11/10/22 11/10/22





  15:19 15:19 21:08


 


RBC  2.96 L   3.02 L


 


Hgb  7.9 L   8.0 L


 


Hct  25.0 L   25.3 L


 


MCH   


 


MCHC   


 


RDW  17.2 H   17.1 H


 


PT   


 


INR   


 


APTT   


 


BUN   


 


Creatinine   


 


Glucose   


 


POC Glucose (mg/dL)   


 


Magnesium   1.5 L 


 


Crossmatch   














  11/11/22 11/11/22 11/11/22





  03:37 03:37 06:44


 


RBC  2.99 L   3.00 L


 


Hgb  8.0 L   7.9 L


 


Hct  24.9 L   25.2 L


 


MCH   


 


MCHC   


 


RDW  17.3 H   17.3 H


 


PT   


 


INR   


 


APTT   


 


BUN   


 


Creatinine   


 


Glucose   185 H 


 


POC Glucose (mg/dL)   


 


Magnesium   


 


Crossmatch   














- Diagnostic Findings


Chest x-ray: image reviewed (Chest x-ray on admission was normal and 

unremarkable.)





Assessment and Plan


Assessment: 





Impression:


Acute GI bleeding with maroon stools on admission, likely lower GI in nature.  

Possible diverticular disease.


Acute blood loss anemia


Chronic atrial fibrillation, maintained on anticoagulation therapy for her 

atrial fibrillation, presently on hold.


Status post EGD, scheduled for colonoscopy in few days.





Recommendation:


Continue present supportive care measures


Continue PPIs


Continue to hold anticoagulants


Agree with colonoscopy as scheduled


Continue to monitor daily CBC and hemoglobin.  Transfuse for hemoglobin below 7


We'll continue to follow.


Can transfer the patient out of the ICU once a bed is available is presently on 

overflow anyway.





Time with Patient: Greater than 30

## 2022-11-11 NOTE — P.PN
Subjective


Progress Note Date: 11/11/22


H&P Date: 11/10/22


Chief Complaint: Fatigued, shortness of breath


This is a pleasant 72-year-old female past medical history of GI bleed-last 

episode reported approximately one year ago, atrial fibrillation-on Eliquis and 

aspirin, gastroesophageal reflux disease, hypertension and multiple other 

medical issues presented to the ER with fatigue,shortness of breath and 

lightheadedness upon exertion.  He reports no rectal bleeding, no hemoptysis but

does report dark stools approximately one week ago- spontaneously subsided.  

Denies abdominal pain.  Denies nausea, vomiting or diarrhea.  Denies cough, 

congestion.  Denies chills or fevers.  Denies chest pain, palpitations.  

Troponin negative. Denies headaches Denies syncope.  Reports itchy 

extremities-puritis. Patient had outpatient lab work reported low hemoglobin 4.5

and instructed to proceed to the ER. On admission, hemoglobin 4.6, platelets 

196, INR 1.2.  Afebrile,1.4.  Blood pressure stable, heart rates mid 80s to 103,

maintaining O2 sats of 100% on room air, respiratory rate 16-20.  Transfused 

with 2 units of packed RBCs in the ER with repeat hemoglobin 6.7.





11/11/2022 completed EGD yesterday reporting no evidence of upper GI bleed, 

antral polyp no further rectal bleeding noted. No bowel movements last night nor

this morning.  Denies abdominal pain. Hemoglobin 7.9, platelets 194.  Potassium 

3.5, and is a 1.7, receiving supplementation.  Renal function stable.





Objective





- Vital Signs


Vital signs: 


                                   Vital Signs











Temp  98.2 F   11/11/22 13:40


 


Pulse  88   11/11/22 13:40


 


Resp  16   11/11/22 13:40


 


BP  149/78   11/11/22 13:40


 


Pulse Ox  96   11/11/22 13:40


 


FiO2      








                                 Intake & Output











 11/10/22 11/11/22 11/11/22





 18:59 06:59 18:59


 


Intake Total 910 240 


 


Output Total  1000 600


 


Balance 910 -760 -600


 


Weight  116.2 kg 


 


Intake:   


 


    


 


  Intake, IV Titration 500  





  Amount   


 


    Sodium Chloride 0.9% 1, 500  





    000 ml @ 75 mls/hr IV .   





    [id number] ONE Rx#:[id number]   


 


  Oral  240 


 


  Blood Product 310  


 


    Rc Cpda-1  Unit 310  





    [id number]   


 


Output:   


 


  Urine  1000 600


 


Other:   


 


  Voiding Method Bedside Commode External Catheter External Catheter


 


  # Voids 0  














- Exam





PHYSICAL EXAM:


VITAL SIGNS: [As above]


GENERAL: Sitting up in bed, no acute distress


HEENT:  Conjunctivae normal. eyes normal. MMM.


NECK: Supple, No JVD. 


CARDIOVASCULAR:  S1, S2 regular.No murmur


RESPIRATION: Unlabored Breath sounds diminished in the bases. 


ABDOMEN:  Soft, nondistended, nontender . No guarding. Bowel sounds heard.


LEGS: Mild edema of lower extremities, with pruritus


PSYCHIATRY: Alert and oriented X3, mood and affect normal.


NERVOUS SYSTEM:  Cranial N 2-12 grossly normal. No focal deficits.  Strength and

sensation grossly intact.


Skin: Warm and dry, scabs  of lower extremities








- Labs


CBC & Chem 7: 


                                 11/11/22 06:44





                                 11/11/22 03:37


Labs: 


                  Abnormal Lab Results - Last 24 Hours (Table)











  11/10/22 11/11/22 11/11/22 Range/Units





  21:08 03:37 03:37 


 


RBC  3.02 L  2.99 L   (3.80-5.40)  m/uL


 


Hgb  8.0 L  8.0 L   (11.4-16.0)  gm/dL


 


Hct  25.3 L  24.9 L   (34.0-46.0)  %


 


RDW  17.1 H  17.3 H   (11.5-15.5)  %


 


Glucose    185 H  (74-99)  mg/dL














  11/11/22 Range/Units





  06:44 


 


RBC  3.00 L  (3.80-5.40)  m/uL


 


Hgb  7.9 L  (11.4-16.0)  gm/dL


 


Hct  25.2 L  (34.0-46.0)  %


 


RDW  17.3 H  (11.5-15.5)  %


 


Glucose   (74-99)  mg/dL














Assessment and Plan


Assessment: 


Acute GI bleed with blood loss anemia, status post transfusion of packed RBCs, 

status post nondiagnostic EGD.


History of chronic A. fib on Eliquis and aspirin; both currently on hold.


Hypomagnesemia


Hypokalemia

















Plan: Continue on current medication regime ,monitoring and symptomatic 

treatment.  Continue on PPI and hold anticoagulation- Colonoscopy scheduled for 

Monday. Close monitoring of hemoglobin, electrolytes with repeat labs ordered f

or a.m. patient is overflow, awaiting a MedSur bed. Prognosis guarded given 

multiple complex medical issues.








The impression and plan of care has been dictated as directed.





:


I performed a history and examination of this patient,  discussed the same with 

the dictator.  I agree with the dictator's note ,documented as a scribe.  Any 

additional findings or plans will be noted.

## 2022-11-11 NOTE — P.PN
Subjective


Progress Note Date: 11/11/22





CHIEF COMPLAINT: Anemia





HISTORY OF PRESENT ILLNESS: Patient is currently in the ICU as a selective 

overflow for GI bleed and anemia.  Hemoglobin on admission 6.7 she is now at 

7.9.  She received 2 units of blood yesterday.  Nursing staff did report maroon-

colored stools yesterday.  No further bowel movements reported to the evening or

this morning.  Stool for occult blood was negative yesterday.  Afebrile.  Vitals

stable.  WBC 7.5 hemoglobin 7.9 platelets 194 sodium is 137 potassium 3.5 

creatinine 0.89 magnesium 1.7 patient denies any abdominal pain.  Patient status

post EGD which revealed an antral polyp and no evidence of GI bleeding.  

Hematology has also been consulted in regards to patient's anemia.





Patient seen and examined with Dr. mendoza


PHYSICAL EXAM: 


VITAL SIGNS: Reviewed.


GENERAL: Well-developed in no acute distress. 


HEENT:  No sclera icterus. Extraocular movements grossly intact.  Moist buccal 

mucosa. Head is atraumatic, normocephalic. 


ABDOMEN:  Soft.  Nondistended. Nontender. 


NEUROLOGIC: Alert and oriented. Cranial nerves II through XII grossly intact.





ASSESSMENT: 


1.  Acute GI bleed with maroon stools 


2.  Acute blood loss anemia 


3.  History of A. fib on anticoagulation at home


4.  Hypomagnesemia 


5.  Status post EGD with antral polyp





PLAN: 


-Patient scheduled for colonoscopy on Monday, 11/14/2022 with Dr. mendoza


-Continue regular diet


-Continue to monitor hemoglobin


-Continue monitoring for any signs or symptoms of bleeding





Physician Assistant note has been reviewed by physician. Signing provider agrees

with the documented findings, assessment, and plan of care. 





Objective





- Vital Signs


Vital signs: 


                                   Vital Signs











Temp  98.3 F   11/11/22 12:00


 


Pulse  82   11/11/22 12:00


 


Resp  17   11/11/22 12:00


 


BP  139/70   11/11/22 12:32


 


Pulse Ox  98   11/11/22 12:00


 


FiO2      








                                 Intake & Output











 11/10/22 11/11/22 11/11/22





 18:59 06:59 18:59


 


Intake Total 910 240 


 


Output Total  1000 600


 


Balance 910 -760 -600


 


Weight  116.2 kg 


 


Intake:   


 


    


 


  Intake, IV Titration 500  





  Amount   


 


    Sodium Chloride 0.9% 1, 500  





    000 ml @ 75 mls/hr IV .   





    [id number] ONE Rx#:[id number]   


 


  Oral  240 


 


  Blood Product 310  


 


    Rc Cpda-1  Unit 310  





    [id number]   


 


Output:   


 


  Urine  1000 600


 


Other:   


 


  Voiding Method Bedside Commode External Catheter External Catheter


 


  # Voids 0  














- Labs


CBC & Chem 7: 


                                 11/11/22 06:44





                                 11/11/22 03:37


Labs: 


                  Abnormal Lab Results - Last 24 Hours (Table)











  11/09/22 11/10/22 11/10/22 Range/Units





  19:04 15:19 15:19 


 


RBC   2.96 L   (3.80-5.40)  m/uL


 


Hgb   7.9 L   (11.4-16.0)  gm/dL


 


Hct   25.0 L   (34.0-46.0)  %


 


RDW   17.2 H   (11.5-15.5)  %


 


Glucose     (74-99)  mg/dL


 


Magnesium    1.5 L  (1.6-2.3)  mg/dL


 


Crossmatch  See Detail    














  11/10/22 11/11/22 11/11/22 Range/Units





  21:08 03:37 03:37 


 


RBC  3.02 L  2.99 L   (3.80-5.40)  m/uL


 


Hgb  8.0 L  8.0 L   (11.4-16.0)  gm/dL


 


Hct  25.3 L  24.9 L   (34.0-46.0)  %


 


RDW  17.1 H  17.3 H   (11.5-15.5)  %


 


Glucose    185 H  (74-99)  mg/dL


 


Magnesium     (1.6-2.3)  mg/dL


 


Crossmatch     














  11/11/22 Range/Units





  06:44 


 


RBC  3.00 L  (3.80-5.40)  m/uL


 


Hgb  7.9 L  (11.4-16.0)  gm/dL


 


Hct  25.2 L  (34.0-46.0)  %


 


RDW  17.3 H  (11.5-15.5)  %


 


Glucose   (74-99)  mg/dL


 


Magnesium   (1.6-2.3)  mg/dL


 


Crossmatch

## 2022-11-11 NOTE — P.CONS
History of Present Illness





- Reason for Consult


Consult date: 11/11/22


anemia


Requesting physician: Sofie Moreira





- Chief Complaint


GI bleed, severe anemia





- History of Present Illness





Ms. Waggoner is a pleasant female we have been asked to see for severe anemia.  

She is admitted with acute GI bleed.  She report SOB and lightheadedness prior 

to admit, this is improved.  She denies black or bloody stool.  She had a GI 

bleed April of this year.  She had EGD with polyp removed at that time.  She did

well until the last few weeks.  She denies any other bleeding, no anemia prior. 

She does not recall when she was put on eliquis or who prescribed it.  No 

nausea, vomiting, abd pain, acute changes in bowel or bladder. 


She has a PHM of GERD, HTN.  











Review of Systems





10 point ROS is as stated in HPI, pt is poor historian





Past Medical History


Past Medical History: Atrial Fibrillation, GERD/Reflux, Hypertension


Additional Past Medical History / Comment(s): GI bleed.


History of Any Multi-Drug Resistant Organisms: None Reported


Past Surgical History: Hysterectomy, Tonsillectomy


Past Anesthesia/Blood Transfusion Reactions: No Reported Reaction


Past Psychological History: No Psychological Hx Reported


Smoking Status: Never smoker


Past Alcohol Use History: None Reported


Past Drug Use History: None Reported





Medications and Allergies


                                Home Medications











 Medication  Instructions  Recorded  Confirmed  Type


 


Omeprazole [PriLOSEC] 20 mg PO DAILY 06/21/19 11/09/22 History


 


cloNIDine HCL [Catapres] 0.2 mg PO BID 06/21/19 11/09/22 History


 


estradioL [Estrace] 0.5 mg PO BID 06/21/19 11/09/22 History


 


ALPRAZolam [Xanax] 0.25 mg PO DAILY PRN 11/09/22 11/09/22 History


 


Apixaban [Eliquis] 5 mg PO BID 11/09/22 11/09/22 History


 


Aspirin EC [Ecotrin Low Dose] 81 mg PO DAILY 11/09/22 11/09/22 History


 


Losartan/Hydrochlorothiazide 1 tab PO DAILY 11/09/22 11/09/22 History





[Losartan-Hctz 100-25 mg Tab]    


 


Metoprolol Tartrate [Lopressor] 50 mg PO TID 11/09/22 11/09/22 History








                                    Allergies











Allergy/AdvReac Type Severity Reaction Status Date / Time


 


codeine Allergy  Anaphylaxis Verified 11/09/22 19:53


 


Iodine and Iodide Containing Allergy  Anaphylaxis Verified 11/09/22 19:53





Produc     


 


shellfish derived [Shellfish] Allergy  Anaphylaxis Verified 11/09/22 19:53


 


shrimp Allergy  Anaphylaxis Verified 11/09/22 19:53














Physical Exam


Vitals: 


                                   Vital Signs











  Temp Pulse Pulse Resp BP Pulse Ox


 


 11/11/22 12:32      139/70 


 


 11/11/22 12:00  98.3 F   82  17  160/74  98


 


 11/11/22 08:00    98  18  160/143 


 


 11/11/22 04:00  98.3 F   92  18  140/119  95


 


 11/11/22 00:00  98.6 F   104 H  20  140/73  94 L


 


 11/10/22 20:00  98.5 F   100  18  118/94  93 L


 


 11/10/22 16:00  98.5 F  96   16  119/81  97








                                Intake and Output











 11/10/22 11/11/22 11/11/22





 22:59 06:59 14:59


 


Intake Total 240  


 


Output Total 700 300 600


 


Balance -460 -300 -600


 


Intake:   


 


  Oral 240  


 


Output:   


 


  Urine 700 300 600


 


Other:   


 


  Voiding Method External Catheter External Catheter External Catheter


 


  Weight  116.2 kg 














- Constitutional


General appearance: cooperative, no acute distress, obese





- EENT


Eyes: anicteric sclerae, EOMI


ENT: hearing grossly normal





- Neck


Neck: no lymphadenopathy





- Respiratory


Respiratory: bilateral: CTA





- Cardiovascular


Rhythm: irregularly irregular


Heart sounds: normal: S1, S2


Abnormal Heart Sounds: no systolic murmur, no diastolic murmur, no rub, no S3 

Gallop, no S4 Gallop, no click, no other


  ** leg


Peripheral Edema: bilateral: None





- Gastrointestinal


General gastrointestinal: no absent bowel sounds, no decreased bowel sounds, no 

distended, no hepatomegaly, no hyperactive bowel sounds, normal bowel sounds, no

 organomegaly, no rigid, no scaphoid, soft, no splenomegaly, no tenderness, no 

umbilical hernia, no ventral hernia





- Integumentary


Integumentary: pale





- Neurologic


Neurologic: CNII-XII intact





- Musculoskeletal


Musculoskeletal: generalized weakness, strength equal bilaterally





- Psychiatric


Psychiatric: A&O x's 3, appropriate affect, intact judgment & insight





Results


CBC & Chem 7: 


                                 11/11/22 06:44





                                 11/11/22 03:37


Labs: 


                  Abnormal Lab Results - Last 24 Hours (Table)











  11/10/22 11/10/22 11/10/22 Range/Units





  15:19 15:19 21:08 


 


RBC  2.96 L   3.02 L  (3.80-5.40)  m/uL


 


Hgb  7.9 L   8.0 L  (11.4-16.0)  gm/dL


 


Hct  25.0 L   25.3 L  (34.0-46.0)  %


 


RDW  17.2 H   17.1 H  (11.5-15.5)  %


 


Glucose     (74-99)  mg/dL


 


Magnesium   1.5 L   (1.6-2.3)  mg/dL














  11/11/22 11/11/22 11/11/22 Range/Units





  03:37 03:37 06:44 


 


RBC  2.99 L   3.00 L  (3.80-5.40)  m/uL


 


Hgb  8.0 L   7.9 L  (11.4-16.0)  gm/dL


 


Hct  24.9 L   25.2 L  (34.0-46.0)  %


 


RDW  17.3 H   17.3 H  (11.5-15.5)  %


 


Glucose   185 H   (74-99)  mg/dL


 


Magnesium     (1.6-2.3)  mg/dL











Chest x-ray: report reviewed





Assessment and Plan


(1) GI bleed


Current Visit: Yes   Status: Acute   Priority: High   Code(s): K92.2 - 

GASTROINTESTINAL HEMORRHAGE, UNSPECIFIED   SNOMED Code(s): 31281787


   





(2) Anemia


Current Visit: Yes   Status: Chronic   Priority: High   Code(s): D64.9 - ANEMIA,

 UNSPECIFIED   SNOMED Code(s): 631321111


   


Plan: 





Normocytic anemia, acute GI bleed.  S/P 3 units PRBCs.  She had a GI bleed April 2022. In April pt was on eliquis and taking asa, ibuprofen daily.  Pt on 

eliquis for afib.  She is not able to recall who ordered it or how long she has 

been on it.  In the notes reviewed in the paper chart, watchmans procedure was 

mentioned.  May have to be considered as this is the 2nd major bleed on 

anticoagulation in 7 months.  She did have EGD with benign polyp removed, ch

ronic gastritis.  She reports she still takes ibuprofen on occasion, denies 

taking aspirin but, it is on her home med list. Will investigate to see how long

 she has been on anticoagulation.  Is this GI bleeding directly related to 

beginning anticoagulation or is it new.


Anemia work up ordered on pre transfusion blood if. Will supplement as needed.  


Transfuse for a Hgb < 7.  








Dr jimenezests:  I have seen and examined pt, performed H&P, developed impression 

and plan of care.  Discussed with dictator, agree with documentation, dictated 

as a scribe.

## 2022-11-12 LAB
ANION GAP SERPL CALC-SCNC: 9.4 MMOL/L (ref 10–18)
BUN SERPL-SCNC: 13.3 MG/DL (ref 9–27)
BUN/CREAT SERPL: 16.38 RATIO (ref 12–20)
CALCIUM SPEC-MCNC: 10.5 MG/DL (ref 8.7–10.3)
CHLORIDE SERPL-SCNC: 99 MMOL/L (ref 96–109)
CO2 SERPL-SCNC: 24.9 MMOL/L (ref 20–27.5)
ERYTHROCYTE [DISTWIDTH] IN BLOOD BY AUTOMATED COUNT: 2.8 X 10*6/UL (ref 4.1–5.2)
ERYTHROCYTE [DISTWIDTH] IN BLOOD: 18.9 % (ref 11.5–14.5)
FERRITIN SERPL-MCNC: 16.5 NG/ML (ref 10–291)
GLUCOSE SERPL-MCNC: 162 MG/DL (ref 70–110)
HCT VFR BLD AUTO: 23.8 % (ref 37.2–46.3)
HGB BLD-MCNC: 7 G/DL (ref 12–15)
IRON SERPL-MCNC: 37 UG/DL (ref 50–170)
MAGNESIUM SPEC-SCNC: 1.8 MG/DL (ref 1.5–2.4)
MCH RBC QN AUTO: 25 PG (ref 27–32)
MCHC RBC AUTO-ENTMCNC: 29.4 G/DL (ref 32–37)
MCV RBC AUTO: 85 FL (ref 80–97)
NRBC BLD AUTO-RTO: 0.3 /100 WBCS (ref 0–0)
PLATELET # BLD AUTO: 193 X 10*3/UL (ref 140–440)
POTASSIUM SERPL-SCNC: 3.8 MMOL/L (ref 3.5–5.5)
SODIUM SERPL-SCNC: 133 MMOL/L (ref 135–145)
TIBC SERPL-MCNC: 475 UG/DL (ref 228–460)
VIT B12 SERPL-MCNC: 316 PG/ML (ref 200–944)
WBC # BLD AUTO: 6.72 X 10*3/UL (ref 4.5–10)

## 2022-11-12 RX ADMIN — ACETAMINOPHEN PRN MG: 325 TABLET, FILM COATED ORAL at 08:45

## 2022-11-12 RX ADMIN — METOPROLOL TARTRATE SCH MG: 50 TABLET, FILM COATED ORAL at 07:55

## 2022-11-12 RX ADMIN — PANTOPRAZOLE SODIUM SCH MG: 40 INJECTION, POWDER, FOR SOLUTION INTRAVENOUS at 07:55

## 2022-11-12 RX ADMIN — METOPROLOL TARTRATE SCH MG: 50 TABLET, FILM COATED ORAL at 21:15

## 2022-11-12 RX ADMIN — LOSARTAN POTASSIUM AND HYDROCHLOROTHIAZIDE SCH EACH: 12.5; 5 TABLET ORAL at 07:54

## 2022-11-12 RX ADMIN — METOPROLOL TARTRATE SCH MG: 50 TABLET, FILM COATED ORAL at 15:38

## 2022-11-12 NOTE — P.PN
Subjective


Progress Note Date: 11/12/22





This is a 72-year-old male with known history of atrial fibrillation, previous 

history of GI bleeding, and she was treated at [facility].  The patient 

herself had no clue as what was the source of her GI bleeding while she was at 

[facility].  Patient has been maintained on eliquis and on aspirin for her 

atrial fibrillation.  She presented to the ER on 11/9/22, mostly with symptoms 

of weakness fatigue and shortness of breath.  Outpatient Workup discovered that 

the patient had a hemoglobin of 4.5, and she was advised to go to ER.  In the ER

the patient received 2 units of packed RBCs and repeat hemoglobin was 6.7.  

Patient received a total of 3 units of packed RBCs since she was admitted and 

the patient has been as an overflow in the ICU.  Today I was asked to see her on

consultation.  He was already seen by general surgery on consultation, underwent

EGD yesterday, and the patient was found to have mostly antral polyp.  This was 

noted to be mildly inflamed, biopsies were performed, there was no evidence of 

active bleeding noted in the esophagus or stomach.  Now the patient is scheduled

for colonoscopy on Monday and this will be done by Dr. Flores.  In the 

meantime there is no evidence of active bleeding at this point, and her 

hemoglobin seems to be stable.  Labs today were noted to be relatively 

unremarkable and again her hemoglobin is 7.9.  Patient received a total of 3 un

its of packed RBCs since admission and she underwent a nondiagnostic EGD





The patient is seen today 11/12/2022 in follow-up on the regular medical floor. 

She is currently resting comfortably in bed.  Maintaining good O2 saturations in

the upper 90s on room air.  She's been afebrile.  Hemodynamically stable.She is 

status post 3 units of packed red blood cells this admission.  Current 

hemoglobin 7.9.she remains on Protonix.  The plan is for colonoscopy on 

11/14/2022.





Objective





- Vital Signs


Vital signs: 


                                   Vital Signs











Temp  98.7 F   11/12/22 04:01


 


Pulse  71   11/12/22 08:00


 


Resp  20   11/12/22 08:00


 


BP  127/68   11/12/22 07:53


 


Pulse Ox  98   11/12/22 04:01


 


FiO2      








                                 Intake & Output











 11/11/22 11/12/22 11/12/22





 18:59 06:59 18:59


 


Intake Total  580 


 


Output Total 600  


 


Balance -600 580 


 


Weight  112.9 kg 


 


Intake:   


 


  Oral  580 


 


Output:   


 


  Urine 600  


 


Other:   


 


  Voiding Method External Catheter Toilet Toilet


 


  # Voids  2 














- Exam





GENERAL EXAM: Alert, pleasant 72-year-old female, on room air, comfortable in no

apparent distress.


HEAD: Normocephalic.


EYES: Normal reaction of pupils, equal size.


NOSE: Clear with pink turbinates.


THROAT: No erythema or exudates.


NECK: No masses, no JVD.


CHEST: No chest wall deformity.


LUNGS: Equal air entry with no crackles, wheeze, rhonchi or dullness.


CVS: S1 and S2 normal with no audible murmur, irregular rhythm.


ABDOMEN: No hepatosplenomegaly, normal bowel sounds, no guarding or rigidity.


SPINE: No scoliosis or deformity


SKIN: No rashes


CENTRAL NERVOUS SYSTEM: No focal deficits, tone is normal in all 4 extremities.


EXTREMITIES: There is no peripheral edema.  No clubbing, no cyanosis.  

Peripheral pulses are intact.





- Labs


CBC & Chem 7: 


                                 11/11/22 06:44





                                 11/11/22 03:37


Labs: 


                  Abnormal Lab Results - Last 24 Hours (Table)











  11/11/22 Range/Units





  03:37 


 


Iron  37 L  ()  ug/dL


 


TIBC  475 H  (228-460)  ug/dL


 


% Saturation  7.71 L  (12.00-45.00)   














Assessment and Plan


Assessment: 





Acute GI bleeding with maroon stools on admission, likely lower GI in nature.  

Possible diverticular disease.





Acute blood loss anemia, status post 3 units of packed red blood cells.  Current

hemoglobin 7.9





Chronic atrial fibrillation, maintained on anticoagulation therapy for her 

atrial fibrillation, presently on hold.





Status post EGD, scheduled for colonoscopy in few days.





Plan:





The patient was seen and evaluated


Currently stable from the pulmonary and critical care standpoint


Awaiting colonoscopy on 11/14/2022


We will see as needed





I have personally seen and examined the patient, performed the documentation and

the assessment and plan as written.  Number of minutes spent on the visit: 10.

## 2022-11-12 NOTE — P.PN
Subjective


Progress Note Date: 11/12/22














CHIEF COMPLAINT: GI bleed





HISTORY OF PRESENT ILLNESS: The patient is a 72-year-old female admitted with GI

bleed and anemia.  She had a prior upper endoscopy demonstrating a polyp.  No 

reports of abdominal pain.  She did receive blood products 3 units during 

hospitalization.  No reports of active bleeding at this time.





ROS: No reports of nausea and vomiting.  No bowel movements.  No fevers or chill

s.  No new chest pain.  No productive sputum





PHYSICAL EXAM: 


VITAL SIGNS: Reviewed


CONSTITUTIONAL:  Well developed and in no acute distress. 


EYES:  Conjuctivae without sclera icterus. Extraocular movements grossly intact.




HEAD, EARS, NOSE, THROAT: Moist buccal mucosa. Head is atraumatic, normocep

halic. Hears conversational speech. No nasal drainage.


RESPIRATORY:  Non-labored respirations and equal bilateral excursions.


CARDIOVASCULAR:  Palpable 2+ radial pulses.  


ABDOMEN: No peritonitis


MUSCULOSKELETAL:  No gross deformity of the lower extremities noted.  No 

clubbing.  No cyanosis.


SKIN: Good skin turgor. Well perfused. 


NEUROLOGIC: Cranial nerves II through XII grossly intact.  No focal or 

lateralizing signs. 


PSYCH:  Appropriate affect.  Alert and oriented to person, place and time. 





CLINICAL LABS: Reviewed.  Hemoglobin down to 8.0-7.0.





ASSESSMENT: 


1.  GI bleed with anemia


2.  Status post blood transfusions


3.  Morbid obesity due to excess calories, BMI 45.5





PLAN: 


1.  Agree with proceeding and left lower endoscopy during this admission due to 

persistent anemia


2.  Transfuse as needed for symptomatic anemia








Objective





- Vital Signs


Vital signs: 


                                   Vital Signs











Temp  97.5 F L  11/12/22 11:12


 


Pulse  73   11/12/22 11:12


 


Resp  18   11/12/22 11:12


 


BP  115/77   11/12/22 11:12


 


Pulse Ox  98   11/12/22 11:12


 


FiO2      








                                 Intake & Output











 11/11/22 11/12/22 11/12/22





 18:59 06:59 18:59


 


Intake Total  580 


 


Output Total 600  


 


Balance -600 580 


 


Weight  112.9 kg 


 


Intake:   


 


  Oral  580 


 


Output:   


 


  Urine 600  


 


Other:   


 


  Voiding Method External Catheter Toilet Toilet


 


  # Voids  2 














- Labs


CBC & Chem 7: 


                                 11/12/22 07:34





                                 11/12/22 07:34


Labs: 


                  Abnormal Lab Results - Last 24 Hours (Table)











  11/11/22 11/12/22 11/12/22 Range/Units





  03:37 07:34 07:34 


 


RBC   2.80 L   (4.10-5.20)  X 10*6/uL


 


Hgb   7.0 L   (12.0-15.0)  g/dL


 


Hct   23.8 L   (37.2-46.3)  %


 


MCH   25.0 L   (27.0-32.0)  pg


 


MCHC   29.4 L   (32.0-37.0)  g/dL


 


RDW   18.9 H   (11.5-14.5)  %


 


Absolute Nucleated RBC   0.02 H   (0.00-0.00)  X 10*3/uL


 


NRBC/100 WBC Diff   0.3 H   (0.0-0.0)  /100 WBCS


 


Sodium    133 L  (135-145)  mmol/L


 


Anion Gap    9.40 L  (10.00-18.00)  mmol/L


 


Glucose    162 H  ()  mg/dL


 


Calcium    10.5 H  (8.7-10.3)  mg/dL


 


Iron  37 L    ()  ug/dL


 


TIBC  475 H    (228-460)  ug/dL


 


% Saturation  7.71 L    (12.00-45.00)

## 2022-11-13 LAB
ANION GAP SERPL CALC-SCNC: 7 MMOL/L
BASOPHILS # BLD AUTO: 0 K/UL (ref 0–0.2)
BASOPHILS NFR BLD AUTO: 1 %
BUN SERPL-SCNC: 11 MG/DL (ref 7–17)
CALCIUM SPEC-MCNC: 10.9 MG/DL (ref 8.4–10.2)
CHLORIDE SERPL-SCNC: 101 MMOL/L (ref 98–107)
CO2 SERPL-SCNC: 27 MMOL/L (ref 22–30)
EOSINOPHIL # BLD AUTO: 0.3 K/UL (ref 0–0.7)
EOSINOPHIL NFR BLD AUTO: 5 %
ERYTHROCYTE [DISTWIDTH] IN BLOOD BY AUTOMATED COUNT: 3.35 M/UL (ref 3.8–5.4)
ERYTHROCYTE [DISTWIDTH] IN BLOOD: 18.6 % (ref 11.5–15.5)
GLUCOSE SERPL-MCNC: 163 MG/DL (ref 74–99)
HCT VFR BLD AUTO: 28.6 % (ref 34–46)
HGB BLD-MCNC: 8.8 GM/DL (ref 11.4–16)
LYMPHOCYTES # SPEC AUTO: 1.2 K/UL (ref 1–4.8)
LYMPHOCYTES NFR SPEC AUTO: 21 %
MCH RBC QN AUTO: 26.2 PG (ref 25–35)
MCHC RBC AUTO-ENTMCNC: 30.7 G/DL (ref 31–37)
MCV RBC AUTO: 85.4 FL (ref 80–100)
MONOCYTES # BLD AUTO: 0.4 K/UL (ref 0–1)
MONOCYTES NFR BLD AUTO: 7 %
NEUTROPHILS # BLD AUTO: 3.6 K/UL (ref 1.3–7.7)
NEUTROPHILS NFR BLD AUTO: 63 %
PLATELET # BLD AUTO: 187 K/UL (ref 150–450)
POTASSIUM SERPL-SCNC: 3.8 MMOL/L (ref 3.5–5.1)
SODIUM SERPL-SCNC: 135 MMOL/L (ref 137–145)
WBC # BLD AUTO: 5.8 K/UL (ref 3.8–10.6)

## 2022-11-13 RX ADMIN — SODIUM FERRIC GLUCONATE COMPLEX SCH MLS/HR: 12.5 INJECTION INTRAVENOUS at 11:41

## 2022-11-13 RX ADMIN — LOSARTAN POTASSIUM AND HYDROCHLOROTHIAZIDE SCH EACH: 12.5; 5 TABLET ORAL at 09:24

## 2022-11-13 RX ADMIN — METOPROLOL TARTRATE SCH MG: 50 TABLET, FILM COATED ORAL at 20:59

## 2022-11-13 RX ADMIN — PANTOPRAZOLE SODIUM SCH MG: 40 INJECTION, POWDER, FOR SOLUTION INTRAVENOUS at 09:25

## 2022-11-13 RX ADMIN — METOPROLOL TARTRATE SCH MG: 50 TABLET, FILM COATED ORAL at 16:06

## 2022-11-13 RX ADMIN — METOPROLOL TARTRATE SCH MG: 50 TABLET, FILM COATED ORAL at 09:25

## 2022-11-13 NOTE — P.PN
Subjective


Progress Note Date: 11/13/22














CHIEF COMPLAINT: GI bleed





HISTORY OF PRESENT ILLNESS: The patient is a 72-year-old female admitted with GI

bleed and anemia. She is tolerating her bowel prep. She is having bowel 

movements. No reports of abdominal pain. No gross bleeding.





ROS: No reports of nausea and vomiting.  No bowel movements.  No fevers or 

chills.  No new chest pain.  No productive sputum





PHYSICAL EXAM: 


VITAL SIGNS: Reviewed


CONSTITUTIONAL:  Well developed and in no acute distress. 


EYES:  Conjuctivae without sclera icterus. Extraocular movements grossly intact.




HEAD, EARS, NOSE, THROAT: Moist buccal mucosa. Head is atraumatic, 

normocephalic. Hears conversational speech. No nasal drainage.


RESPIRATORY:  Non-labored respirations and equal bilateral excursions.


CARDIOVASCULAR:  Palpable 2+ radial pulses.  


ABDOMEN: No peritonitis. Obese


MUSCULOSKELETAL:  No gross deformity of the lower extremities noted.  No clubbi

ng.  No cyanosis.


SKIN: Good skin turgor. Well perfused. 


NEUROLOGIC: Cranial nerves II through XII grossly intact.  No focal or 

lateralizing signs. 


PSYCH:  Appropriate affect.  Alert and oriented to person, place and time. 





CLINICAL LABS: Reviewed.  Hemoglobin up 7.0 to 8.8 after 1 u pRC transfusion





ASSESSMENT: 


1.  GI bleed with anemia


2.  Status post blood transfusions


3.  Morbid obesity due to excess calories, BMI 45.5


4.  Status post blood transfusions. 





PLAN: 


1.  Continue GoLytely prep for colonoscopy


2.  Monitor hemoglobin. 





Objective





- Vital Signs


Vital signs: 


                                   Vital Signs











Temp  97.4 F L  11/13/22 17:38


 


Pulse  94   11/13/22 20:56


 


Resp  18   11/13/22 17:38


 


BP  166/89   11/13/22 20:56


 


Pulse Ox  99   11/13/22 17:38


 


FiO2      








                                 Intake & Output











 11/13/22 11/13/22 11/14/22





 06:59 18:59 06:59


 


Weight 112.6 kg  


 


Other:   


 


  Voiding Method Toilet Toilet Toilet


 


  # Voids 3 5 


 


  # Bowel Movements  4 














- Labs


CBC & Chem 7: 


                                 11/13/22 15:03





                                 11/13/22 15:04


Labs: 


                  Abnormal Lab Results - Last 24 Hours (Table)











  11/13/22 11/13/22 Range/Units





  15:03 15:04 


 


RBC  3.35 L   (3.80-5.40)  m/uL


 


Hgb  8.8 L   (11.4-16.0)  gm/dL


 


Hct  28.6 L   (34.0-46.0)  %


 


MCHC  30.7 L   (31.0-37.0)  g/dL


 


RDW  18.6 H   (11.5-15.5)  %


 


Sodium   135 L  (137-145)  mmol/L


 


Glucose   163 H  (74-99)  mg/dL


 


Calcium   10.9 H  (8.4-10.2)  mg/dL

## 2022-11-13 NOTE — P.PN
Subjective


Progress Note Date: 11/13/22


Principal diagnosis: 





GI bleeding





-No acute events overnight


-Ms. Waggoner denies any bloody bowel movements at this time


-She notes having dyspnea on exertion when ambulating from the bed to the 

restroom





Objective





- Vital Signs


Vital signs: 


                                   Vital Signs











Temp  98.2 F   11/13/22 11:25


 


Pulse  81   11/13/22 11:25


 


Resp  18   11/13/22 11:25


 


BP  150/85   11/13/22 11:25


 


Pulse Ox  98   11/13/22 11:25


 


FiO2      








                                 Intake & Output











 11/12/22 11/13/22 11/13/22





 18:59 06:59 18:59


 


Intake Total 310  


 


Balance 310  


 


Weight  112.6 kg 


 


Intake:   


 


  Blood Product 310  


 


    Rc As-1  Unit 310  





    [id number]   


 


Other:   


 


  Voiding Method Toilet Toilet Toilet


 


  # Voids 3 3 


 


  # Bowel Movements 1  














- Constitutional


General appearance: Present: cooperative, no acute distress, obese





- EENT


Eyes: Present: EOMI





- Respiratory


Respiratory: bilateral: CTA





- Cardiovascular


Rhythm: regular


Heart sounds: normal: S1, S2





- Gastrointestinal


General gastrointestinal: Present: normal bowel sounds, soft.  Absent: 

tenderness





- Integumentary


Integumentary Comment(s): 





Excoriations on the lower extremities bilaterally





- Neurologic


Neurologic: Present: CNII-XII intact





- Labs


CBC & Chem 7: 


                                 11/12/22 07:34





                                 11/12/22 07:34


Labs: 


                  Abnormal Lab Results - Last 24 Hours (Table)











  11/09/22 Range/Units





  19:04 


 


Crossmatch  See Detail  














Assessment and Plan


Assessment: 





Ms. Waggoner is a 72-year-old woman with a past medical history significant for 

atrial fibrillation on digoxin and Eliquis prior to admission along with a prior

episode of GI bleeding who presented with increased fatigue and dyspnea on 

exertion found to be anemic secondary to blood loss from the GI tract while on 

therapeutic anticoagulation


(1) Iron deficiency anemia


Current Visit: Yes   Status: Acute   Code(s): D50.9 - IRON DEFICIENCY ANEMIA, 

UNSPECIFIED   SNOMED Code(s): 15529203


   





(2) GI bleed


Current Visit: Yes   Status: Acute   Priority: High   Code(s): K92.2 - 

GASTROINTESTINAL HEMORRHAGE, UNSPECIFIED   SNOMED Code(s): 91097240


   


Plan: 





#Iron deficiency anemia


-Noted to have increased fatigue and dyspnea on exertion prior to admission


-Has had previous episode of GI bleeding on therapeutic Eliquis for atrial 

fibrillation


-Iron studies performed on blood prior to admission noted ferritin 16.5 and iron

saturation 7.71% consistent with a diagnosis of iron deficiency


-Vitamin B12 and folic acid were within normal limits


-IV ferric gluconate 125 mg IV 3 ordered today


-He should have outpatient follow-up for continued management of iron deficiency

anemia





#GI bleeding


-Presented with hemoglobin 4.6 on initial presentation


-Did not have any visible blood loss from the GI tract prior to admission, but 

noted to have hematochezia during this admission


-Has received a total of 3 units of packed red blood cells during this admission


-Fourth unit ordered today for hemoglobin of 7


-EGD during this admission did not reveal source of acute blood loss


-Plan for colonoscopy on 11/14/2022 with bowel prep today per primary team

## 2022-11-13 NOTE — P.PN
Subjective


Progress Note Date: 11/12/22





72-year-old male with known history of atrial fibrillation, previous history of 

GI bleeding, and she was treated at [facility].  The patient herself had no

clue as what was the source of her GI bleeding while she was at [facility].

 Patient has been maintained on eliquis and on aspirin for her atrial 

fibrillation.  She presented to the ER on 11/9/22, mostly with symptoms of 

weakness fatigue and shortness of breath.  Outpatient Workup discovered that the

patient had a hemoglobin of 4.5, and she was advised to go to ER.  In the ER the

patient received 2 units of packed RBCs and repeat hemoglobin was 6.7.  Patient 

received a total of 3 units of packed RBCs since she was admitted and the pat

ient has been as an overflow in the ICU.  Today I was asked to see her on 

consultation.  He was already seen by general surgery on consultation, underwent

EGD yesterday, and the patient was found to have mostly antral polyp.  This was 

noted to be mildly inflamed, biopsies were performed, there was no evidence of 

active bleeding noted in the esophagus or stomach.  Now the patient is scheduled

for colonoscopy on Monday and this will be done by Dr. Flores.  In the 

meantime there is no evidence of active bleeding at this point, and her 

hemoglobin seems to be stable.  Labs today were noted to be relatively 

unremarkable and again her hemoglobin is 7.9.  Patient received a total of 3 

units of packed RBCs since admission and she underwent a nondiagnostic EGD





Objective





- Vital Signs


Vital signs: 


                                   Vital Signs











Temp  97.5 F L  11/12/22 11:12


 


Pulse  73   11/12/22 11:12


 


Resp  18   11/12/22 11:12


 


BP  115/77   11/12/22 11:12


 


Pulse Ox  98   11/12/22 11:12


 


FiO2      








                                 Intake & Output











 11/11/22 11/12/22 11/12/22





 18:59 06:59 18:59


 


Intake Total  580 


 


Output Total 600  


 


Balance -600 580 


 


Weight  112.9 kg 


 


Intake:   


 


  Oral  580 


 


Output:   


 


  Urine 600  


 


Other:   


 


  Voiding Method External Catheter Toilet Toilet


 


  # Voids  2 














- Exam





- Constitutional


General appearance: Present: average body habitus, cooperative, no acute 

distress


- EENT


Eyes: Present: anicteric sclerae, EOMI, PERRLA, normal appearance


ENT: Present: hearing grossly normal, normal oropharynx


Ears: bilateral: normal


- Neck


Neck: Present: normal ROM.  Absent: lymphadenopathy, rigidity, thyromegaly


Carotids: negative: bruit present


Thyroid: bilateral: normal size, negative: enlarged, nodule


- Respiratory


Respiratory: bilateral: CTA, negative: rales, rhonchi, wheezing


- Cardiovascular


Rhythm: regular


Heart sounds: normal: S1, S2


Abnormal Heart Sounds: Absent: systolic murmur, diastolic murmur


- Gastrointestinal


General gastrointestinal: Present: normal bowel sounds, soft.  Absent: disten

ded, organomegaly, tenderness


- Genitourinary


Genitourinary Comment(s): deferred


- Integumentary


Integumentary: Present: normal turgor.  Absent: jaundiced, rash, ulcer


- Neurologic


Neurologic: Present: CNII-XII intact.  Absent: focal deficits


- Musculoskeletal


Musculoskeletal: Present: gait normal, strength equal bilaterally


- Psychiatric


Psychiatric: Present: A&O x's 3, appropriate affect, intact judgment & insight








- Labs


CBC & Chem 7: 


                                 11/13/22 15:03





                                 11/13/22 15:04


Labs: 


                  Abnormal Lab Results - Last 24 Hours (Table)











  11/11/22 11/12/22 11/12/22 Range/Units





  03:37 07:34 07:34 


 


RBC   2.80 L   (4.10-5.20)  X 10*6/uL


 


Hgb   7.0 L   (12.0-15.0)  g/dL


 


Hct   23.8 L   (37.2-46.3)  %


 


MCH   25.0 L   (27.0-32.0)  pg


 


MCHC   29.4 L   (32.0-37.0)  g/dL


 


RDW   18.9 H   (11.5-14.5)  %


 


Absolute Nucleated RBC   0.02 H   (0.00-0.00)  X 10*3/uL


 


NRBC/100 WBC Diff   0.3 H   (0.0-0.0)  /100 WBCS


 


Sodium    133 L  (135-145)  mmol/L


 


Anion Gap    9.40 L  (10.00-18.00)  mmol/L


 


Glucose    162 H  ()  mg/dL


 


Calcium    10.5 H  (8.7-10.3)  mg/dL


 


Iron  37 L    ()  ug/dL


 


TIBC  475 H    (228-460)  ug/dL


 


% Saturation  7.71 L    (12.00-45.00)   














Assessment and Plan


Assessment: 





1.  Acute GI bleeding with maroon stools on admission, likely lower GI in 

nature.  Possible diverticular disease.  No further episodes of bleeding





2.  Acute blood loss anemia, status post 3 units of packed red blood cells.  

Current hemoglobin 7.0; we will transfuse with 1 unit of packed RBCs





3.  Chronic atrial fibrillation, maintained on anticoagulation therapy for her 

atrial fibrillation, presently on hold.





4.  Status post EGD, scheduled for colonoscopy in few days.





Plan:





The patient was seen and evaluated


Currently stable from the pulmonary and critical care standpoint


Awaiting colonoscopy on 11/14/2022

## 2022-11-13 NOTE — P.PN
Subjective


Progress Note Date: 11/13/22


Principal diagnosis: 





Acute GI bleed


Acute blood loss anemia


Iron deficiency anemia





72-year-old male with known history of atrial fibrillation, previous history of 

GI bleeding, and she was treated at [facility].  The patient herself had no

clue as what was the source of her GI bleeding while she was at [facility].

 Patient has been maintained on eliquis and on aspirin for her atrial 

fibrillation.  She presented to the ER on 11/9/22, mostly with symptoms of 

weakness fatigue and shortness of breath.  Outpatient Workup discovered that the

patient had a hemoglobin of 4.5, and she was advised to go to ER.  In the ER the

patient received 2 units of packed RBCs and repeat hemoglobin was 6.7.  Patient 

received a total of 3 units of packed RBCs since she was admitted and the 

patient has been as an overflow in the ICU.  Today I was asked to see her on 

consultation.  He was already seen by general surgery on consultation, underwent

EGD yesterday, and the patient was found to have mostly antral polyp.  This was 

noted to be mildly inflamed, biopsies were performed, there was no evidence of 

active bleeding noted in the esophagus or stomach.  Now the patient is scheduled

for colonoscopy on Monday and this will be done by Dr. Flores.  In the 

meantime there is no evidence of active bleeding at this point, and her 

hemoglobin seems to be stable.  Labs today were noted to be relatively 

unremarkable and again her hemoglobin is 7.9.  Patient received a total of 3 

units of packed RBCs since admission and she underwent a nondiagnostic EGD





11/13/2022


Patient is seen and evaluated in room at bedside; denies any specific 

complaints; no bloody bowel movements; continues to have exertional dyspnea


Vital signs are reviewed and stable


-Noted to have increased fatigue and dyspnea on exertion prior to admission


-Has had previous episode of GI bleeding on therapeutic Eliquis for atrial 

fibrillation


-Iron studies performed on blood prior to admission noted ferritin 16.5 and iron

saturation 7.71% consistent with a diagnosis of iron deficiency


-Vitamin B12 and folic acid were within normal limits


-IV ferric gluconate 125 mg IV 3 ordered today





Objective





- Vital Signs


Vital signs: 


                                   Vital Signs











Temp  98.2 F   11/13/22 11:25


 


Pulse  81   11/13/22 11:25


 


Resp  18   11/13/22 11:25


 


BP  150/85   11/13/22 11:25


 


Pulse Ox  98   11/13/22 11:25


 


FiO2      








                                 Intake & Output











 11/12/22 11/13/22 11/13/22





 18:59 06:59 18:59


 


Intake Total 310  


 


Balance 310  


 


Weight  112.6 kg 


 


Intake:   


 


  Blood Product 310  


 


    Rc As-1  Unit 310  





    [id number]   


 


Other:   


 


  Voiding Method Toilet Toilet Toilet


 


  # Voids 3 3 3


 


  # Bowel Movements 1  1














- Exam





- Constitutional


General appearance: Present: average body habitus, cooperative, no acute 

distress


- EENT


Eyes: Present: anicteric sclerae, EOMI, PERRLA, normal appearance


ENT: Present: hearing grossly normal, normal oropharynx


Ears: bilateral: normal


- Neck


Neck: Present: normal ROM.  Absent: lymphadenopathy, rigidity, thyromegaly


Carotids: negative: bruit present


Thyroid: bilateral: normal size, negative: enlarged, nodule


- Respiratory


Respiratory: bilateral: CTA, negative: rales, rhonchi, wheezing


- Cardiovascular


Rhythm: regular


Heart sounds: normal: S1, S2


Abnormal Heart Sounds: Absent: systolic murmur, diastolic murmur


- Gastrointestinal


General gastrointestinal: Present: normal bowel sounds, soft.  Absent: 

distended, organomegaly, tenderness


- Genitourinary


Genitourinary Comment(s): deferred


- Integumentary


Integumentary: Present: normal turgor.  Absent: jaundiced, rash, ulcer


- Neurologic


Neurologic: Present: CNII-XII intact.  Absent: focal deficits


- Musculoskeletal


Musculoskeletal: Present: gait normal, strength equal bilaterally


- Psychiatric


Psychiatric: Present: A&O x's 3, appropriate affect, intact judgment & insight








- Labs


CBC & Chem 7: 


                                 11/13/22 15:03





                                 11/13/22 15:04


Labs: 


                  Abnormal Lab Results - Last 24 Hours (Table)











  11/09/22 11/13/22 11/13/22 Range/Units





  19:04 15:03 15:04 


 


RBC   3.35 L   (3.80-5.40)  m/uL


 


Hgb   8.8 L   (11.4-16.0)  gm/dL


 


Hct   28.6 L   (34.0-46.0)  %


 


MCHC   30.7 L   (31.0-37.0)  g/dL


 


RDW   18.6 H   (11.5-15.5)  %


 


Sodium    135 L  (137-145)  mmol/L


 


Glucose    163 H  (74-99)  mg/dL


 


Calcium    10.9 H  (8.4-10.2)  mg/dL


 


Crossmatch  See Detail    














Assessment and Plan


Assessment: 





1.  Acute GI bleeding with maroon stools on admission, likely lower GI in 

nature.  Possible diverticular disease.  No further episodes of bleeding





2.  Acute blood loss anemia, status post 3 units of packed red blood cells.  

Current hemoglobin 7.0; we will transfuse with 1 unit of packed RBCs





3.  Chronic atrial fibrillation, maintained on anticoagulation therapy for her 

atrial fibrillation, presently on hold.





4.  Status post EGD, scheduled for colonoscopy in few days.





Plan:





The patient was seen and evaluated


Currently stable from the pulmonary and critical care standpoint


Awaiting colonoscopy on 11/14/2022

## 2022-11-14 LAB
ANION GAP SERPL CALC-SCNC: 7 MMOL/L
BASOPHILS # BLD AUTO: 0 K/UL (ref 0–0.2)
BASOPHILS NFR BLD AUTO: 1 %
BUN SERPL-SCNC: 9 MG/DL (ref 7–17)
CALCIUM SPEC-MCNC: 10.9 MG/DL (ref 8.4–10.2)
CHLORIDE SERPL-SCNC: 102 MMOL/L (ref 98–107)
CO2 SERPL-SCNC: 28 MMOL/L (ref 22–30)
EOSINOPHIL # BLD AUTO: 0.3 K/UL (ref 0–0.7)
EOSINOPHIL NFR BLD AUTO: 4 %
ERYTHROCYTE [DISTWIDTH] IN BLOOD BY AUTOMATED COUNT: 3.28 M/UL (ref 3.8–5.4)
ERYTHROCYTE [DISTWIDTH] IN BLOOD: 19 % (ref 11.5–15.5)
GLUCOSE SERPL-MCNC: 157 MG/DL (ref 74–99)
HCT VFR BLD AUTO: 27.9 % (ref 34–46)
HGB BLD-MCNC: 8.6 GM/DL (ref 11.4–16)
LYMPHOCYTES # SPEC AUTO: 1 K/UL (ref 1–4.8)
LYMPHOCYTES NFR SPEC AUTO: 15 %
MCH RBC QN AUTO: 26.3 PG (ref 25–35)
MCHC RBC AUTO-ENTMCNC: 31 G/DL (ref 31–37)
MCV RBC AUTO: 84.8 FL (ref 80–100)
MONOCYTES # BLD AUTO: 0.5 K/UL (ref 0–1)
MONOCYTES NFR BLD AUTO: 8 %
NEUTROPHILS # BLD AUTO: 4.4 K/UL (ref 1.3–7.7)
NEUTROPHILS NFR BLD AUTO: 69 %
PLATELET # BLD AUTO: 189 K/UL (ref 150–450)
POTASSIUM SERPL-SCNC: 3.8 MMOL/L (ref 3.5–5.1)
SODIUM SERPL-SCNC: 137 MMOL/L (ref 137–145)
WBC # BLD AUTO: 6.3 K/UL (ref 3.8–10.6)

## 2022-11-14 RX ADMIN — METOPROLOL TARTRATE SCH MG: 50 TABLET, FILM COATED ORAL at 08:38

## 2022-11-14 RX ADMIN — METOPROLOL TARTRATE SCH MG: 50 TABLET, FILM COATED ORAL at 15:38

## 2022-11-14 RX ADMIN — PANTOPRAZOLE SODIUM SCH MG: 40 INJECTION, POWDER, FOR SOLUTION INTRAVENOUS at 08:37

## 2022-11-14 RX ADMIN — LOSARTAN POTASSIUM AND HYDROCHLOROTHIAZIDE SCH EACH: 12.5; 5 TABLET ORAL at 08:37

## 2022-11-14 RX ADMIN — SODIUM FERRIC GLUCONATE COMPLEX SCH MLS/HR: 12.5 INJECTION INTRAVENOUS at 08:36

## 2022-11-14 RX ADMIN — METOPROLOL TARTRATE SCH MG: 50 TABLET, FILM COATED ORAL at 21:23

## 2022-11-14 RX ADMIN — ACETAMINOPHEN PRN MG: 325 TABLET, FILM COATED ORAL at 21:27

## 2022-11-14 NOTE — P.PN
Subjective


Progress Note Date: 11/14/22


H&P Date: 11/10/22


Chief Complaint: Fatigued, shortness of breath


This is a pleasant 72-year-old female past medical history of GI bleed-last 

episode reported approximately one year ago, atrial fibrillation-on Eliquis and 

aspirin, gastroesophageal reflux disease, hypertension and multiple other 

medical issues presented to the ER with fatigue,shortness of breath and 

lightheadedness upon exertion.  He reports no rectal bleeding, no hemoptysis but

does report dark stools approximately one week ago- spontaneously subsided.  

Denies abdominal pain.  Denies nausea, vomiting or diarrhea.  Denies cough, 

congestion.  Denies chills or fevers.  Denies chest pain, palpitations.  

Troponin negative. Denies headaches Denies syncope.  Reports itchy 

extremities-puritis. Patient had outpatient lab work reported low hemoglobin 4.5

and instructed to proceed to the ER. On admission, hemoglobin 4.6, platelets 

196, INR 1.2.  Afebrile,1.4.  Blood pressure stable, heart rates mid 80s to 103,

maintaining O2 sats of 100% on room air, respiratory rate 16-20.  Transfused 

with 2 units of packed RBCs in the ER with repeat hemoglobin 6.7.





11/11/2022 completed EGD yesterday reporting no evidence of upper GI bleed, 

antral polyp no further rectal bleeding noted. No bowel movements last night nor

this morning.  Denies abdominal pain. Hemoglobin 7.9, platelets 194.  Potassium 

3.5, and is a 1.7, receiving supplementation.  Renal function stable.








11/14/2022 NPO, colonoscopy pending.  Reported she completed GoLYTELY prep.  

Denies any further rectal bleeding or bloody bowel movements.  Reports 

exertional dyspnea improving.  Receiving IV iron.  Hemoglobin currently 8.6, 

platelets 189.  Renal function stable.  Blood sugars controlled.





Objective





- Vital Signs


Vital signs: 


                                   Vital Signs











Temp  97.5 F L  11/14/22 08:35


 


Pulse  104 H  11/14/22 08:35


 


Resp  20   11/14/22 08:35


 


BP  164/99   11/14/22 08:35


 


Pulse Ox  96   11/14/22 08:35


 


FiO2      








                                 Intake & Output











 11/13/22 11/14/22 11/14/22





 18:59 06:59 18:59


 


Weight  110.6 kg 


 


Other:   


 


  Voiding Method Toilet Toilet Toilet


 


  # Voids 5  


 


  # Bowel Movements 4 3 














- Exam





PHYSICAL EXAM:


VITAL SIGNS: [As above]


GENERAL: Alert and oriented 3, Sitting up in bed, no acute distress


HEENT:  Conjunctivae normal. eyes normal.  Oral mucosa dry.


NECK: Supple, No JVD. 


CARDIOVASCULAR:  S1, S2 regular.No murmur


RESPIRATION: Unlabored Breath sounds diminished in the bases. 


ABDOMEN:  Soft, nondistended, nontender . No guarding. Bowel sounds heard.


LEGS: Minimal edema of lower extremities, with pruritus, improving


NERVOUS SYSTEM:  Cranial N 2-12 grossly normal. No focal deficits. Strength and 

sensation grossly intact.


Skin: Warm and dry, scabs of lower extremities, nondraining.








- Labs


CBC & Chem 7: 


                                 11/14/22 06:34





                                 11/14/22 06:34


Labs: 


                  Abnormal Lab Results - Last 24 Hours (Table)











  11/13/22 11/13/22 11/14/22 Range/Units





  15:03 15:04 06:34 


 


RBC  3.35 L   3.28 L  (3.80-5.40)  m/uL


 


Hgb  8.8 L   8.6 L  (11.4-16.0)  gm/dL


 


Hct  28.6 L   27.9 L  (34.0-46.0)  %


 


MCHC  30.7 L    (31.0-37.0)  g/dL


 


RDW  18.6 H   19.0 H  (11.5-15.5)  %


 


Sodium   135 L   (137-145)  mmol/L


 


Glucose   163 H   (74-99)  mg/dL


 


Calcium   10.9 H   (8.4-10.2)  mg/dL














  11/14/22 Range/Units





  06:34 


 


RBC   (3.80-5.40)  m/uL


 


Hgb   (11.4-16.0)  gm/dL


 


Hct   (34.0-46.0)  %


 


MCHC   (31.0-37.0)  g/dL


 


RDW   (11.5-15.5)  %


 


Sodium   (137-145)  mmol/L


 


Glucose  157 H  (74-99)  mg/dL


 


Calcium  10.9 H  (8.4-10.2)  mg/dL














Assessment and Plan


Assessment: 


Acute GI bleed with blood loss anemia, status post transfusion of packed RBCs, 

status post nondiagnostic EGD.


Iron deficient anemia, receiving IV iron supplements.


History of chronic A. fib on Eliquis and aspirin; both currently on hold.


Hypomagnesemia


Hypokalemia

















Plan: Continue on current medication regime ,monitoring and symptomatic 

treatment.  Continue on PPI and hold anticoagulation- Colonoscopy pending. Close

monitoring of hemoglobin, electrolytes. Prognosis guarded given multiple complex

medical issues.








The impression and plan of care has been dictated as directed.





:


I performed a history and examination of this patient,  discussed the same with 

the dictator.  I agree with the dictator's note ,documented as a scribe.  Any 

additional findings or plans will be noted.

## 2022-11-14 NOTE — P.OP
Date of Procedure: 11/14/22


Preoperative Diagnosis: 


Anemia, GI bleed


Postoperative Diagnosis: 


External hemorrhoids





Diverticulosis


Procedure(s) Performed: 


Colonoscopy


Anesthesia: MAC


Surgeon: Hany Flores


Pathology: none sent


Condition: stable


Disposition: PACU


Description of Procedure: 


The patient's placed on the endoscopy table in the lateral position.  She 

received IV sedation.  It'll rectal exam was performed.  This revealed external 

hemorrhoids.  Flexible colonoscope was then placed patient anus passed 

throughout the entire colon.  The ileocecal valve was visualized.  The cecum, 

ascending and transverse colon appeared normal.  In the descending and sigmoid 

colon there was minimal diverticular changes.  Scope was brought back the rectum

this appeared normal.  Scope withdrawn for patient.





There is no evidence of any GI bleed.  Presumed patient may have had bleeding 

from external hemorrhoids were diverticular disease.

## 2022-11-15 VITALS
HEART RATE: 85 BPM | TEMPERATURE: 98 F | SYSTOLIC BLOOD PRESSURE: 182 MMHG | RESPIRATION RATE: 20 BRPM | DIASTOLIC BLOOD PRESSURE: 68 MMHG

## 2022-11-15 LAB
BASOPHILS # BLD AUTO: 0 K/UL (ref 0–0.2)
BASOPHILS NFR BLD AUTO: 1 %
EOSINOPHIL # BLD AUTO: 0.3 K/UL (ref 0–0.7)
EOSINOPHIL NFR BLD AUTO: 6 %
ERYTHROCYTE [DISTWIDTH] IN BLOOD BY AUTOMATED COUNT: 3.1 M/UL (ref 3.8–5.4)
ERYTHROCYTE [DISTWIDTH] IN BLOOD: 19.3 % (ref 11.5–15.5)
HCT VFR BLD AUTO: 26.6 % (ref 34–46)
HGB BLD-MCNC: 8.4 GM/DL (ref 11.4–16)
LYMPHOCYTES # SPEC AUTO: 1.2 K/UL (ref 1–4.8)
LYMPHOCYTES NFR SPEC AUTO: 21 %
MCH RBC QN AUTO: 27.1 PG (ref 25–35)
MCHC RBC AUTO-ENTMCNC: 31.6 G/DL (ref 31–37)
MCV RBC AUTO: 85.6 FL (ref 80–100)
MONOCYTES # BLD AUTO: 0.4 K/UL (ref 0–1)
MONOCYTES NFR BLD AUTO: 8 %
NEUTROPHILS # BLD AUTO: 3.5 K/UL (ref 1.3–7.7)
NEUTROPHILS NFR BLD AUTO: 62 %
PLATELET # BLD AUTO: 167 K/UL (ref 150–450)
WBC # BLD AUTO: 5.6 K/UL (ref 3.8–10.6)

## 2022-11-15 RX ADMIN — SODIUM FERRIC GLUCONATE COMPLEX SCH MLS/HR: 12.5 INJECTION INTRAVENOUS at 09:21

## 2022-11-15 RX ADMIN — PANTOPRAZOLE SODIUM SCH MG: 40 INJECTION, POWDER, FOR SOLUTION INTRAVENOUS at 09:21

## 2022-11-15 RX ADMIN — METOPROLOL TARTRATE SCH MG: 50 TABLET, FILM COATED ORAL at 09:21

## 2022-11-15 RX ADMIN — MAGNESIUM SULFATE IN DEXTROSE SCH MLS/HR: 10 INJECTION, SOLUTION INTRAVENOUS at 12:34

## 2022-11-15 RX ADMIN — MAGNESIUM SULFATE IN DEXTROSE SCH MLS/HR: 10 INJECTION, SOLUTION INTRAVENOUS at 14:08

## 2022-11-15 RX ADMIN — LOSARTAN POTASSIUM AND HYDROCHLOROTHIAZIDE SCH EACH: 12.5; 5 TABLET ORAL at 09:21

## 2022-11-15 RX ADMIN — ACETAMINOPHEN PRN MG: 325 TABLET, FILM COATED ORAL at 09:33

## 2022-11-15 NOTE — P.DS
Providers


Date of admission: 


11/09/22 19:39





Expected date of discharge: 11/15/22


Attending physician: 


Donnell Boykin





Consults: 





                                        





11/09/22 19:39


Consult Physician Urgent 


   Consulting Provider: Hany Flores


   Consult Reason/Comments: GI bleed


   Do you want consulting provider notified?: Yes





11/10/22 09:57


Consult Physician Routine 


   Consulting Provider: Rey Hay


   Consult Reason/Comments: Anemia


   Do you want consulting provider notified?: Yes





11/10/22 15:04


Consult Physician Routine 


   Consulting Provider: Wellington Arnold


   Consult Reason/Comments: ICU management


   Do you want consulting provider notified?: Yes











Primary care physician: 


Donnell Boykin





[facility] Course: 


Final Diagnoses:


Acute GI bleed with blood loss anemia, status post transfusion of packed RBCs, 

status post nondiagnostic EGD.  External hemorrhoids, diverticulosis reported 

per colonoscopy.


Iron deficient anemia, receiving IV iron supplements.


History of chronic A. fib on Eliquis and aspirin


Hypomagnesemia


Hypokalemia














Hospital course:This is a pleasant 72-year-old female past medical history of GI

bleed-last episode reported approximately one year ago, atrial fibrillation-on 

Eliquis and aspirin, gastroesophageal reflux disease, hypertension and multiple 

other medical issues presented to the ER with fatigue,shortness of breath and 

lightheadedness upon exertion.  He reports no rectal bleeding, no hemoptysis but

does report dark stools approximately one week ago- spontaneously subsided.  

Denies abdominal pain.  Denies nausea, vomiting or diarrhea.  Denies cough, 

congestion.  Denies chills or fevers.  Denies chest pain, palpitations.  

Troponin negative. Denies headaches Denies syncope.  Reports itchy extremities-

puritis. Patient had outpatient lab work reported low hemoglobin 4.5 and 

instructed to proceed to the ER. On admission, hemoglobin 4.6, platelets 196, 

INR 1.2.  Afebrile,1.4.  Blood pressure stable, heart rates mid 80s to 103, 

maintaining O2 sats of 100% on room air, respiratory rate 16-20.  Transfused 

with 2 units of packed RBCs in the ER with repeat hemoglobin 6.7.





11/11/2022 completed EGD yesterday reporting no evidence of upper GI bleed, 

antral polyp no further rectal bleeding noted. No bowel movements last night nor

this morning.  Denies abdominal pain. Hemoglobin 7.9, platelets 194.  Potassium 

3.5, and is a 1.7, receiving supplementation.  Renal function stable.








11/14/2022 NPO, colonoscopy pending.  Reported she completed GoLYTELY prep.  

Denies any further rectal bleeding or bloody bowel movements.  Reports 

exertional dyspnea improving.  Receiving IV iron.  Hemoglobin currently 8.6, 

platelets 189.  Renal function stable.  Blood sugars controlled.











11/15/22 Completed colonoscopy yesterday reporting external hemorrhoids, 

diverticulosis, no evidence of any GI bleed with presumption prior bleeding may 

have been from external hemorrhoids or diverticular disease.  Significant 

clinical improvement.  Complains of some itchiness of bilateral lower legs, 

triamcinolone cream prescribed.  Denies any further bleeding.  Denies chest 

pain, palpitations, shortness of breath.  Significant clinical improvement.  

Patient will be discharged home today in a stable condition with guarded 

prognosis pending final DC recommendations including resuming anticoagulation, 

clearance for DC as per general surgery.














The impression and plan of care has been dictated as directed.





:


I performed a history and examination of this patient,  discussed the same with 

the dictator.  I agree with the dictator's note ,documented as a scribe.  Any 

additional findings or plans will be noted.








Patient Condition at Discharge: Stable





Plan - Discharge Summary


New Discharge Prescriptions: 


New


   Triamcinolone 0.5% Cream [Kenalog 0.5% Cream] 1 applic TOPICAL BID  each





Continue


   Omeprazole [PriLOSEC] 20 mg PO DAILY


   cloNIDine HCL [Catapres] 0.2 mg PO BID


   estradioL [Estrace] 0.5 mg PO BID


   ALPRAZolam [Xanax] 0.25 mg PO DAILY PRN


     PRN Reason: Anxiety


   Losartan/Hydrochlorothiazide [Losartan-Hctz 100-25 mg Tab] 1 tab PO DAILY


   Apixaban [Eliquis] 5 mg PO BID


   Aspirin EC [Ecotrin Low Dose] 81 mg PO DAILY #0


   Metoprolol Tartrate [Lopressor] 50 mg PO TID


Discharge Medication List





Omeprazole [PriLOSEC] 20 mg PO DAILY 06/21/19 [History]


cloNIDine HCL [Catapres] 0.2 mg PO BID 06/21/19 [History]


estradioL [Estrace] 0.5 mg PO BID 06/21/19 [History]


ALPRAZolam [Xanax] 0.25 mg PO DAILY PRN 11/09/22 [History]


Apixaban [Eliquis] 5 mg PO BID 11/09/22 [History]


Losartan/Hydrochlorothiazide [Losartan-Hctz 100-25 mg Tab] 1 tab PO DAILY 

11/09/22 [History]


Metoprolol Tartrate [Lopressor] 50 mg PO TID 11/09/22 [History]


Aspirin EC [Ecotrin Low Dose] 81 mg PO DAILY #0 11/15/22 [Rx]


Triamcinolone 0.5% Cream [Kenalog 0.5% Cream] 1 applic TOPICAL BID  each 

11/15/22 [Rx]








Follow up Appointment(s)/Referral(s): 


Donnell Boykin DO [Primary Care Provider] - 1-2 days

## 2022-11-15 NOTE — P.PN
Subjective


Progress Note Date: 11/15/22





CHIEF COMPLAINT: Anemia





HISTORY OF PRESENT ILLNESS: Patient is currently regular medical floor.  She is 

status post colonoscopy that did reveal diverticulosis and external hemorrhoids.

 Her hemoglobin has been stable at 8.6.  She denies any blood in her stools.  

She's tolerating regular diet.  Denies any abdominal pain.  Apparently she had 

complained of chest pain.  She was evaluated by cardiology with no evidence of 

acute coronary syndrome.  Patient is scheduled for discharge today.  





Patient seen and examined with Dr. mendoza





PHYSICAL EXAM: 


VITAL SIGNS: Reviewed.


GENERAL: Well-developed in no acute distress. 


HEENT:  No sclera icterus. Extraocular movements grossly intact.  Moist buccal 

mucosa. Head is atraumatic, normocephalic. 


ABDOMEN:  Soft.  Nondistended. Nontender. 


NEUROLOGIC: Alert and oriented. Cranial nerves II through XII grossly intact.





ASSESSMENT: 


1.  Acute GI bleed with maroon stools resolved.  Status post colonoscopy 

revealing diverticulosis and external hemorrhoids.


2.  Acute blood loss anemia 


3.  History of A. fib on anticoagulation at home


4.  Hypomagnesemia 


5.  Status post EGD with antral polyp





PLAN:


-Patient can be discharged from surgical standpoint


-Continue to hold anticoagulation 


-Agree with cardiology recommendations regarding possible watchman procedure 

since patient has had recurrent bleeding while on anticoagulation











Physician Assistant note has been reviewed by physician. Signing provider agrees

with the documented findings, assessment, and plan of care. 





Objective





- Vital Signs


Vital signs: 


                                   Vital Signs











Temp  98 F   11/15/22 12:33


 


Pulse  85   11/15/22 12:33


 


Resp  20   11/15/22 12:33


 


BP  182/68   11/15/22 12:33


 


Pulse Ox  99   11/15/22 12:33


 


FiO2      








                                 Intake & Output











 11/14/22 11/15/22 11/15/22





 18:59 06:59 18:59


 


Intake Total 100  


 


Balance 100  


 


Weight  110.6 kg 


 


Intake:   


 


    


 


Other:   


 


  Voiding Method Toilet Toilet Toilet


 


  # Voids 2 2 


 


  # Bowel Movements 1 0 














- Labs


CBC & Chem 7: 


                                 11/15/22 11:12





                                 11/14/22 06:34


Labs: 


                  Abnormal Lab Results - Last 24 Hours (Table)











  11/15/22 11/15/22 Range/Units





  11:12 11:12 


 


RBC  3.10 L   (3.80-5.40)  m/uL


 


Hgb  8.4 L   (11.4-16.0)  gm/dL


 


Hct  26.6 L   (34.0-46.0)  %


 


RDW  19.3 H   (11.5-15.5)  %


 


Magnesium   1.5 L  (1.6-2.3)  mg/dL

## 2022-11-15 NOTE — P.CRDCN
History of Present Illness


Consult date: 11/15/22


Requesting physician: Donnell Boykin


Reason for Consult (text): 


chest pain





Chief complaint: shortness of breath


History of present illness: 


This is a pleasant 72-year-old female patient who follows with Dr. Alvarez for 

Cardiology.  His past medical history of chronic atrial fibrillation for which s

he was anticoagulated on Eliquis, hypertension and recurrent GI bleed.  She was 

hospitalized at [facility] in April at which time her hemoglobin was 3.4.  

At that time workup included an echocardiogram which showed normal LV systolic 

function with mild to moderate MR and moderate TR.  No clear source of bleeding 

was found at that time.  Her Eliquis was resumed.  She had been Recently 

complaining of shortness of breath over the last couple of weeks and had labs 

drawn with her primary care physician.  Hemoglobin came back at 4.5 and she was 

advised to go to the emergency department.  She was admitted on 11/10/2022.  

Anticoagulation has been on hold.  Colonoscopy showed external hemorrhoids but 

no other clear source of bleeding and no clear source of bleeding and EGD.  We 

were asked to see the patient in consultation this morning prior to discharge 

for chest pain.  The patient denies having any complaints of chest discomfort.  

EKG showed atrial fibrillation with no changes indicative of acute ischemia.  

She does continue to have some mild dyspnea on exertion after walking back to be

d from the bathroom.  In talking with the patient she does not have much insight

in regards to her health issues.  She says she's been on anticoagulation for 

about a year but does not recall being told she has atrial fibrillation however 

this appears to be chronic for her.  She complains of some lower extremity edema

that she's been following with her primary care physician for and is improving. 

She has redness and itching to her bilateral lower legs with scratches noted 

that are self-inflicted.  Hemoglobin yesterday was better 8.6.  She is 

potentially being discharged home.








Past Medical History


Past Medical History: Atrial Fibrillation, GERD/Reflux, Hypertension


Additional Past Medical History / Comment(s): GI bleed.


History of Any Multi-Drug Resistant Organisms: None Reported


Past Surgical History: Hysterectomy, Tonsillectomy


Past Anesthesia/Blood Transfusion Reactions: No Reported Reaction


Past Psychological History: No Psychological Hx Reported


Smoking Status: Never smoker


Past Alcohol Use History: None Reported


Past Drug Use History: None Reported





Medications and Allergies


                                Home Medications











 Medication  Instructions  Recorded  Confirmed  Type


 


Omeprazole [PriLOSEC] 20 mg PO DAILY 06/21/19 11/09/22 History


 


cloNIDine HCL [Catapres] 0.2 mg PO BID 06/21/19 11/09/22 History


 


estradioL [Estrace] 0.5 mg PO BID 06/21/19 11/09/22 History


 


ALPRAZolam [Xanax] 0.25 mg PO DAILY PRN 11/09/22 11/09/22 History


 


Apixaban [Eliquis] 5 mg PO BID 11/09/22 11/09/22 History


 


Losartan/Hydrochlorothiazide 1 tab PO DAILY 11/09/22 11/09/22 History





[Losartan-Hctz 100-25 mg Tab]    


 


Metoprolol Tartrate [Lopressor] 50 mg PO TID 11/09/22 11/09/22 History


 


Aspirin EC [Ecotrin Low Dose] 81 mg PO DAILY #0 11/15/22 11/09/22 Rx


 


Triamcinolone 0.5% Cream [Kenalog 1 applic TOPICAL BID  each 11/15/22  Rx





0.5% Cream]    








                                    Allergies











Allergy/AdvReac Type Severity Reaction Status Date / Time


 


codeine Allergy  Anaphylaxis Verified 11/09/22 19:53


 


Iodine and Iodide Containing Allergy  Anaphylaxis Verified 11/09/22 19:53





Produc     


 


shellfish derived [Shellfish] Allergy  Anaphylaxis Verified 11/09/22 19:53


 


shrimp Allergy  Anaphylaxis Verified 11/09/22 19:53














Physical Exam


Vitals: 


                                   Vital Signs











  Temp Pulse Resp BP Pulse Ox


 


 11/15/22 09:18     124/68 


 


 11/15/22 05:57  97.8 F  80  18  139/80  99


 


 11/14/22 20:07  97.6 F  90  16  137/66  97


 


 11/14/22 12:50  98.7 F  87  16  132/76  98








                                Intake and Output











 11/14/22 11/15/22 11/15/22





 22:59 06:59 14:59


 


Other:   


 


  Voiding Method Toilet  


 


  # Voids 2 2 


 


  # Bowel Movements 1 0 


 


  Weight  110.6 kg 











PHYSICAL EXAMINATION: This is a 72-year-old female in no apparent distress at 

the time of my examination.





HEENT: Head is atraumatic, normocephalic. Pupils are equal, round. Sclerae 

anicteric. Conjunctivae are clear. Mucous membranes of the mouth are moist. Neck

 is supple. There is no elevated jugular venous pressure. No carotid bruit is 

heard.


 


CHEST EXAMINATION: Clear to auscultation bilaterally.  No wheezes rales or 

rhonchi. Respirations even and nonlabored.


 


HEART EXAMINATION:  Heart regular, positive S1 and S2.  No S3. No S4.  With a 

systolic murmur.


 





ABDOMEN: Soft, nontender. Bowel sounds are heard. No organomegaly noted.


 


EXTREMITIES: 2+ peripheral pulses with evidence of a stone mild peripheral edema

 and no calf tenderness noted.


 


NEUROLOGIC EXAMINATION: Patient is awake, alert and oriented x3.


 











Results





                                 11/15/22 11:12





                                 11/14/22 06:34


                                       CBC











  11/15/22 Range/Units





  11:12 


 


WBC  5.6  (3.8-10.6)  k/uL


 


RBC  3.10 L  (3.80-5.40)  m/uL


 


Hgb  8.4 L  (11.4-16.0)  gm/dL


 


Hct  26.6 L  (34.0-46.0)  %


 


Plt Count  167  (150-450)  k/uL








                               Current Medications











Generic Name Dose Route Start Last Admin





  Trade Name Freq  PRN Reason Stop Dose Admin


 


Acetaminophen  650 mg  11/11/22 10:02  11/15/22 09:33





  Acetaminophen Tab 325 Mg Tab  PO   650 mg





  Q6HR PRN   Administration





  Fever and/ or Pain  


 


Alprazolam  0.25 mg  11/10/22 06:18 





  Alprazolam 0.25 Mg Tab  PO  





  DAILY PRN  





  Anxiety  


 


Apixaban  5 mg  11/15/22 11:15 





  Apixaban 5 Mg Tab  PO  





  BID CONI  





  Protocol  


 


Clonidine  0.2 mg  11/10/22 09:00  11/15/22 09:21





  Clonidine Hcl 0.1 Mg Tab  PO   0.2 mg





  BID CONI   Administration


 


Estradiol  0.5 mg  11/10/22 09:00  11/15/22 09:21





  Estradiol 0.5 Mg Tab  PO   0.5 mg





  BID CONI   Administration


 


HCTZ/Losartan Potassium  2 each  11/10/22 09:00  11/15/22 09:21





  Losartan-Hctz 50-12.5 Mg 1 Each Tab  PO   2 each





  DAILY CONI   Administration


 


Metoprolol Tartrate  50 mg  11/10/22 09:00  11/15/22 09:21





  Metoprolol Tartrate 50 Mg Tab  PO   50 mg





  TID CONI   Administration


 


Miscellaneous Information  1 each  11/10/22 15:14 





  Magnesium Replacement Protocol 1 Each Misc  MISCELLANE  





  DAILY PRN  





  Per Protocol  





  Protocol  


 


Miscellaneous Information  1 each  11/10/22 18:14 





  Potassium Replacement Protocol 1 Each Misc  MISCELLANE  





  DAILY PRN  





  Per Protocol  





  Protocol  


 


Pantoprazole Sodium  40 mg  11/11/22 09:00  11/15/22 09:21





  Pantoprazole 40 Mg/10 Ml Vial  IVP   40 mg





  DAILY CONI   Administration


 


Triamcinolone Acetonide  1 applic  11/15/22 10:30 





  Triamcinolone Acet 0.5% Cream 15 Gm Tube  TOPICAL  





  BID [facility]  





  Protocol  








                                Intake and Output











 11/14/22 11/15/22 11/15/22





 22:59 06:59 14:59


 


Other:   


 


  Voiding Method Toilet  


 


  # Voids 2 2 


 


  # Bowel Movements 1 0 


 


  Weight  110.6 kg 








                                        





                                 11/15/22 11:12 





                                 11/14/22 06:34 











Assessment and Plan


Assessment: 


#1 acute anemia likely secondary to GI bleed while on anticoagulation, recurrent


#2 chronic atrial fibrillation


#3 hypertension








Plan: 


From cardiology perspective there is no evidence of acute coronary syndrome.  

Patient denies any complaints of chest discomfort.  EKG shows no evidence of 

ischemia.  From our standpoint continue to hold anticoagulation.  She will need 

a follow-up with her primary cardiologist soon.  She will likely benefit from a 

watchman procedure.  At this time we will follow the patient on an as-needed 

basis.  Please do not hesitate to contact us with questions.





NP note has been reviewed, I agree with a documented findings and plan of care. 

 Patient was seen and examined.